# Patient Record
Sex: FEMALE | Race: WHITE | Employment: UNEMPLOYED | URBAN - METROPOLITAN AREA
[De-identification: names, ages, dates, MRNs, and addresses within clinical notes are randomized per-mention and may not be internally consistent; named-entity substitution may affect disease eponyms.]

---

## 2017-01-01 ENCOUNTER — LAB ENCOUNTER (OUTPATIENT)
Dept: LAB | Facility: HOSPITAL | Age: 67
End: 2017-01-01
Attending: INTERNAL MEDICINE
Payer: COMMERCIAL

## 2017-01-01 ENCOUNTER — HOSPITAL ENCOUNTER (OUTPATIENT)
Dept: MAMMOGRAPHY | Facility: HOSPITAL | Age: 67
Discharge: HOME | End: 2017-01-01
Attending: INTERNAL MEDICINE
Payer: COMMERCIAL

## 2017-01-01 DIAGNOSIS — D50.8 OTHER IRON DEFICIENCY ANEMIA: ICD-10-CM

## 2017-01-01 DIAGNOSIS — R92.8 ABNORMAL SCREENING MAMMOGRAM: ICD-10-CM

## 2017-01-01 PROCEDURE — 77066 DX MAMMO INCL CAD BI: CPT | Performed by: INTERNAL MEDICINE

## 2017-01-01 PROCEDURE — 85025 COMPLETE CBC W/AUTO DIFF WBC: CPT

## 2017-01-01 PROCEDURE — 36415 COLL VENOUS BLD VENIPUNCTURE: CPT

## 2017-01-27 ENCOUNTER — HOSPITAL ENCOUNTER (OUTPATIENT)
Dept: MAMMOGRAPHY | Facility: HOSPITAL | Age: 67
Discharge: HOME | End: 2017-01-27
Attending: INTERNAL MEDICINE
Payer: COMMERCIAL

## 2017-01-27 DIAGNOSIS — Z12.31 VISIT FOR SCREENING MAMMOGRAM: ICD-10-CM

## 2017-01-27 PROCEDURE — 77067 SCR MAMMO BI INCL CAD: CPT

## 2017-02-08 ENCOUNTER — HOSPITAL ENCOUNTER (OUTPATIENT)
Dept: MAMMOGRAPHY | Facility: HOSPITAL | Age: 67
Discharge: HOME | End: 2017-02-08
Attending: INTERNAL MEDICINE
Payer: COMMERCIAL

## 2017-02-08 DIAGNOSIS — R92.8 ABNORMAL MAMMOGRAM: ICD-10-CM

## 2017-02-08 PROCEDURE — 77065 DX MAMMO INCL CAD UNI: CPT

## 2017-03-21 PROBLEM — K57.92 DIVERTICULITIS: Status: ACTIVE | Noted: 2017-03-21

## 2017-04-13 PROCEDURE — 86735 MUMPS ANTIBODY: CPT | Performed by: INTERNAL MEDICINE

## 2017-04-13 PROCEDURE — 36415 COLL VENOUS BLD VENIPUNCTURE: CPT | Performed by: INTERNAL MEDICINE

## 2017-04-13 PROCEDURE — 86762 RUBELLA ANTIBODY: CPT | Performed by: INTERNAL MEDICINE

## 2017-04-13 PROCEDURE — 86765 RUBEOLA ANTIBODY: CPT | Performed by: INTERNAL MEDICINE

## 2017-05-15 ENCOUNTER — TELEPHONE (OUTPATIENT)
Dept: GASTROENTEROLOGY | Facility: CLINIC | Age: 67
End: 2017-05-15

## 2017-05-18 ENCOUNTER — HOSPITAL ENCOUNTER (OUTPATIENT)
Dept: GENERAL RADIOLOGY | Age: 67
Discharge: HOME | End: 2017-05-18
Attending: ORTHOPAEDIC SURGERY
Payer: COMMERCIAL

## 2017-05-18 DIAGNOSIS — R52 PAIN: ICD-10-CM

## 2017-05-18 PROCEDURE — 73564 X-RAY EXAM KNEE 4 OR MORE: CPT | Performed by: ORTHOPAEDIC SURGERY

## 2017-05-31 ENCOUNTER — HOSPITAL ENCOUNTER (OUTPATIENT)
Dept: MRI IMAGING | Age: 67
Discharge: HOME | End: 2017-05-31
Attending: ORTHOPAEDIC SURGERY
Payer: COMMERCIAL

## 2017-05-31 DIAGNOSIS — M23.90 INTERNAL DERANGEMENT OF KNEE: ICD-10-CM

## 2017-05-31 DIAGNOSIS — M19.90 OSTEOARTHRITIS: ICD-10-CM

## 2017-05-31 PROCEDURE — 73721 MRI JNT OF LWR EXTRE W/O DYE: CPT | Performed by: ORTHOPAEDIC SURGERY

## 2017-06-14 ENCOUNTER — LAB ENCOUNTER (OUTPATIENT)
Dept: LAB | Facility: HOSPITAL | Age: 67
End: 2017-06-14
Attending: INTERNAL MEDICINE
Payer: COMMERCIAL

## 2017-06-14 DIAGNOSIS — Z12.31 ENCOUNTER FOR MAMMOGRAM TO ESTABLISH BASELINE MAMMOGRAM: Primary | ICD-10-CM

## 2017-06-14 DIAGNOSIS — M19.91 PRIMARY OSTEOARTHRITIS: ICD-10-CM

## 2017-06-14 DIAGNOSIS — E03.9 MYXEDEMA HEART DISEASE: ICD-10-CM

## 2017-06-14 DIAGNOSIS — I51.9 MYXEDEMA HEART DISEASE: ICD-10-CM

## 2017-06-14 DIAGNOSIS — E78.6 FAMILIAL LIPOPROTEIN DEFICIENCY: ICD-10-CM

## 2017-06-14 PROCEDURE — 84443 ASSAY THYROID STIM HORMONE: CPT

## 2017-06-14 PROCEDURE — 86900 BLOOD TYPING SEROLOGIC ABO: CPT

## 2017-06-14 PROCEDURE — 80061 LIPID PANEL: CPT

## 2017-06-14 PROCEDURE — 36415 COLL VENOUS BLD VENIPUNCTURE: CPT

## 2017-06-14 PROCEDURE — 80076 HEPATIC FUNCTION PANEL: CPT

## 2017-06-14 PROCEDURE — 86850 RBC ANTIBODY SCREEN: CPT

## 2017-06-14 PROCEDURE — 86901 BLOOD TYPING SEROLOGIC RH(D): CPT

## 2017-06-14 PROCEDURE — 87641 MR-STAPH DNA AMP PROBE: CPT

## 2017-06-14 PROCEDURE — 80069 RENAL FUNCTION PANEL: CPT

## 2017-06-19 RX ORDER — GARLIC EXTRACT 500 MG
1 CAPSULE ORAL NIGHTLY
COMMUNITY
End: 2017-01-01

## 2017-06-19 RX ORDER — IBUPROFEN 800 MG/1
800 TABLET ORAL
Status: ON HOLD | COMMUNITY
End: 2017-06-30

## 2017-06-26 ENCOUNTER — APPOINTMENT (OUTPATIENT)
Dept: GENERAL RADIOLOGY | Facility: HOSPITAL | Age: 67
DRG: 470 | End: 2017-06-26
Attending: ORTHOPAEDIC SURGERY
Payer: COMMERCIAL

## 2017-06-26 ENCOUNTER — SURGERY (OUTPATIENT)
Age: 67
End: 2017-06-26

## 2017-06-26 ENCOUNTER — ANESTHESIA EVENT (OUTPATIENT)
Dept: SURGERY | Facility: HOSPITAL | Age: 67
DRG: 470 | End: 2017-06-26
Payer: COMMERCIAL

## 2017-06-26 ENCOUNTER — HOSPITAL ENCOUNTER (OUTPATIENT)
Facility: HOSPITAL | Age: 67
Setting detail: OBSERVATION
Discharge: NURSING, REHAB OR LONG-TERM CARE | DRG: 470 | End: 2017-06-30
Attending: ORTHOPAEDIC SURGERY | Admitting: ORTHOPAEDIC SURGERY
Payer: COMMERCIAL

## 2017-06-26 ENCOUNTER — ANESTHESIA (OUTPATIENT)
Dept: SURGERY | Facility: HOSPITAL | Age: 67
DRG: 470 | End: 2017-06-26
Payer: COMMERCIAL

## 2017-06-26 DIAGNOSIS — M17.11 PRIMARY OSTEOARTHRITIS OF RIGHT KNEE: Primary | ICD-10-CM

## 2017-06-26 LAB
INR BLD: 1.1 (ref 0.9–1.2)
PROTHROMBIN TIME: 13.5 SECONDS (ref 11.8–14.5)

## 2017-06-26 PROCEDURE — 88305 TISSUE EXAM BY PATHOLOGIST: CPT | Performed by: ORTHOPAEDIC SURGERY

## 2017-06-26 PROCEDURE — 64447 NJX AA&/STRD FEMORAL NRV IMG: CPT | Performed by: ORTHOPAEDIC SURGERY

## 2017-06-26 PROCEDURE — 88311 DECALCIFY TISSUE: CPT | Performed by: ORTHOPAEDIC SURGERY

## 2017-06-26 PROCEDURE — 76942 ECHO GUIDE FOR BIOPSY: CPT | Performed by: ORTHOPAEDIC SURGERY

## 2017-06-26 PROCEDURE — 99152 MOD SED SAME PHYS/QHP 5/>YRS: CPT | Performed by: ORTHOPAEDIC SURGERY

## 2017-06-26 PROCEDURE — 85610 PROTHROMBIN TIME: CPT | Performed by: ORTHOPAEDIC SURGERY

## 2017-06-26 PROCEDURE — 0SRC0J9 REPLACEMENT OF RIGHT KNEE JOINT WITH SYNTHETIC SUBSTITUTE, CEMENTED, OPEN APPROACH: ICD-10-PCS | Performed by: ORTHOPAEDIC SURGERY

## 2017-06-26 PROCEDURE — 73560 X-RAY EXAM OF KNEE 1 OR 2: CPT | Performed by: ORTHOPAEDIC SURGERY

## 2017-06-26 PROCEDURE — 3E0T3BZ INTRODUCTION OF ANESTHETIC AGENT INTO PERIPHERAL NERVES AND PLEXI, PERCUTANEOUS APPROACH: ICD-10-PCS | Performed by: ANESTHESIOLOGY

## 2017-06-26 DEVICE — IMPLANTABLE DEVICE: Type: IMPLANTABLE DEVICE | Site: KNEE | Status: FUNCTIONAL

## 2017-06-26 DEVICE — COMPONENT PTLR 28MM 1 PG WRE: Type: IMPLANTABLE DEVICE | Site: KNEE | Status: FUNCTIONAL

## 2017-06-26 DEVICE — CEMENT BONE ZIM PALICOS R: Type: IMPLANTABLE DEVICE | Site: KNEE | Status: FUNCTIONAL

## 2017-06-26 RX ORDER — SODIUM CHLORIDE, SODIUM LACTATE, POTASSIUM CHLORIDE, CALCIUM CHLORIDE 600; 310; 30; 20 MG/100ML; MG/100ML; MG/100ML; MG/100ML
INJECTION, SOLUTION INTRAVENOUS CONTINUOUS
Status: DISCONTINUED | OUTPATIENT
Start: 2017-06-26 | End: 2017-06-30

## 2017-06-26 RX ORDER — ROCURONIUM BROMIDE 10 MG/ML
INJECTION, SOLUTION INTRAVENOUS AS NEEDED
Status: DISCONTINUED | OUTPATIENT
Start: 2017-06-26 | End: 2017-06-26 | Stop reason: SURG

## 2017-06-26 RX ORDER — HYDROCODONE BITARTRATE AND ACETAMINOPHEN 5; 325 MG/1; MG/1
2 TABLET ORAL AS NEEDED
Status: DISCONTINUED | OUTPATIENT
Start: 2017-06-26 | End: 2017-06-26 | Stop reason: HOSPADM

## 2017-06-26 RX ORDER — ENOXAPARIN SODIUM 100 MG/ML
30 INJECTION SUBCUTANEOUS EVERY 12 HOURS SCHEDULED
Status: DISCONTINUED | OUTPATIENT
Start: 2017-06-26 | End: 2017-06-30

## 2017-06-26 RX ORDER — HYDROCODONE BITARTRATE AND ACETAMINOPHEN 5; 325 MG/1; MG/1
1 TABLET ORAL AS NEEDED
Status: DISCONTINUED | OUTPATIENT
Start: 2017-06-26 | End: 2017-06-26 | Stop reason: HOSPADM

## 2017-06-26 RX ORDER — NALOXONE HYDROCHLORIDE 0.4 MG/ML
80 INJECTION, SOLUTION INTRAMUSCULAR; INTRAVENOUS; SUBCUTANEOUS AS NEEDED
Status: DISCONTINUED | OUTPATIENT
Start: 2017-06-26 | End: 2017-06-26 | Stop reason: HOSPADM

## 2017-06-26 RX ORDER — CETIRIZINE HYDROCHLORIDE 10 MG/1
10 TABLET ORAL DAILY
Status: DISCONTINUED | OUTPATIENT
Start: 2017-06-27 | End: 2017-06-30

## 2017-06-26 RX ORDER — HYDROMORPHONE HYDROCHLORIDE 1 MG/ML
0.4 INJECTION, SOLUTION INTRAMUSCULAR; INTRAVENOUS; SUBCUTANEOUS EVERY 5 MIN PRN
Status: DISCONTINUED | OUTPATIENT
Start: 2017-06-26 | End: 2017-06-26 | Stop reason: HOSPADM

## 2017-06-26 RX ORDER — HYDROMORPHONE HYDROCHLORIDE 1 MG/ML
0.6 INJECTION, SOLUTION INTRAMUSCULAR; INTRAVENOUS; SUBCUTANEOUS EVERY 5 MIN PRN
Status: DISCONTINUED | OUTPATIENT
Start: 2017-06-26 | End: 2017-06-26 | Stop reason: HOSPADM

## 2017-06-26 RX ORDER — HYDROMORPHONE HYDROCHLORIDE 1 MG/ML
0.2 INJECTION, SOLUTION INTRAMUSCULAR; INTRAVENOUS; SUBCUTANEOUS EVERY 5 MIN PRN
Status: DISCONTINUED | OUTPATIENT
Start: 2017-06-26 | End: 2017-06-26 | Stop reason: HOSPADM

## 2017-06-26 RX ORDER — LACTOBACILLUS ACIDOPH-L.BULGARICUS 1 MILLION CELL CHEWABLE TABLET 1MM CELL
1 TABLET,CHEWABLE ORAL 3 TIMES DAILY
Status: DISCONTINUED | OUTPATIENT
Start: 2017-06-26 | End: 2017-06-30

## 2017-06-26 RX ORDER — LIDOCAINE HYDROCHLORIDE 10 MG/ML
INJECTION, SOLUTION EPIDURAL; INFILTRATION; INTRACAUDAL; PERINEURAL AS NEEDED
Status: DISCONTINUED | OUTPATIENT
Start: 2017-06-26 | End: 2017-06-26 | Stop reason: SURG

## 2017-06-26 RX ORDER — DIPHENHYDRAMINE HYDROCHLORIDE 50 MG/ML
12.5 INJECTION INTRAMUSCULAR; INTRAVENOUS EVERY 4 HOURS PRN
Status: DISCONTINUED | OUTPATIENT
Start: 2017-06-26 | End: 2017-06-30

## 2017-06-26 RX ORDER — 0.9 % SODIUM CHLORIDE 0.9 %
VIAL (ML) INJECTION
Status: DISPENSED
Start: 2017-06-26 | End: 2017-06-27

## 2017-06-26 RX ORDER — SODIUM CHLORIDE 9 MG/ML
INJECTION, SOLUTION INTRAVENOUS
Status: COMPLETED
Start: 2017-06-26 | End: 2017-06-26

## 2017-06-26 RX ORDER — MORPHINE SULFATE 2 MG/ML
2 INJECTION, SOLUTION INTRAMUSCULAR; INTRAVENOUS
Status: DISCONTINUED | OUTPATIENT
Start: 2017-06-26 | End: 2017-06-26

## 2017-06-26 RX ORDER — MORPHINE SULFATE 10 MG/ML
6 INJECTION, SOLUTION INTRAMUSCULAR; INTRAVENOUS EVERY 10 MIN PRN
Status: DISCONTINUED | OUTPATIENT
Start: 2017-06-26 | End: 2017-06-26 | Stop reason: HOSPADM

## 2017-06-26 RX ORDER — DIPHENHYDRAMINE HYDROCHLORIDE 50 MG/ML
25 INJECTION INTRAMUSCULAR; INTRAVENOUS ONCE AS NEEDED
Status: ACTIVE | OUTPATIENT
Start: 2017-06-26 | End: 2017-06-26

## 2017-06-26 RX ORDER — FAMOTIDINE 20 MG/1
20 TABLET ORAL ONCE
Status: COMPLETED | OUTPATIENT
Start: 2017-06-26 | End: 2017-06-26

## 2017-06-26 RX ORDER — DEXAMETHASONE SODIUM PHOSPHATE 4 MG/ML
VIAL (ML) INJECTION AS NEEDED
Status: DISCONTINUED | OUTPATIENT
Start: 2017-06-26 | End: 2017-06-26 | Stop reason: SURG

## 2017-06-26 RX ORDER — ONDANSETRON 2 MG/ML
4 INJECTION INTRAMUSCULAR; INTRAVENOUS ONCE AS NEEDED
Status: DISCONTINUED | OUTPATIENT
Start: 2017-06-26 | End: 2017-06-26 | Stop reason: HOSPADM

## 2017-06-26 RX ORDER — MAGNESIUM HYDROXIDE 1200 MG/15ML
LIQUID ORAL CONTINUOUS PRN
Status: DISCONTINUED | OUTPATIENT
Start: 2017-06-26 | End: 2017-06-26

## 2017-06-26 RX ORDER — MORPHINE SULFATE 2 MG/ML
2 INJECTION, SOLUTION INTRAMUSCULAR; INTRAVENOUS EVERY 10 MIN PRN
Status: DISCONTINUED | OUTPATIENT
Start: 2017-06-26 | End: 2017-06-26 | Stop reason: HOSPADM

## 2017-06-26 RX ORDER — SODIUM CHLORIDE, SODIUM LACTATE, POTASSIUM CHLORIDE, CALCIUM CHLORIDE 600; 310; 30; 20 MG/100ML; MG/100ML; MG/100ML; MG/100ML
INJECTION, SOLUTION INTRAVENOUS CONTINUOUS PRN
Status: DISCONTINUED | OUTPATIENT
Start: 2017-06-26 | End: 2017-06-26 | Stop reason: SURG

## 2017-06-26 RX ORDER — NALBUPHINE HCL 10 MG/ML
2.5 AMPUL (ML) INJECTION EVERY 4 HOURS PRN
Status: DISCONTINUED | OUTPATIENT
Start: 2017-06-26 | End: 2017-06-27

## 2017-06-26 RX ORDER — GLYCOPYRROLATE 0.2 MG/ML
INJECTION INTRAMUSCULAR; INTRAVENOUS AS NEEDED
Status: DISCONTINUED | OUTPATIENT
Start: 2017-06-26 | End: 2017-06-26 | Stop reason: SURG

## 2017-06-26 RX ORDER — MORPHINE SULFATE 4 MG/ML
4 INJECTION, SOLUTION INTRAMUSCULAR; INTRAVENOUS EVERY 10 MIN PRN
Status: DISCONTINUED | OUTPATIENT
Start: 2017-06-26 | End: 2017-06-26 | Stop reason: HOSPADM

## 2017-06-26 RX ORDER — NALOXONE HYDROCHLORIDE 0.4 MG/ML
0.08 INJECTION, SOLUTION INTRAMUSCULAR; INTRAVENOUS; SUBCUTANEOUS
Status: DISCONTINUED | OUTPATIENT
Start: 2017-06-26 | End: 2017-06-27

## 2017-06-26 RX ORDER — METOCLOPRAMIDE 10 MG/1
10 TABLET ORAL ONCE
Status: COMPLETED | OUTPATIENT
Start: 2017-06-26 | End: 2017-06-26

## 2017-06-26 RX ORDER — ONDANSETRON 2 MG/ML
4 INJECTION INTRAMUSCULAR; INTRAVENOUS EVERY 4 HOURS PRN
Status: DISCONTINUED | OUTPATIENT
Start: 2017-06-26 | End: 2017-06-30

## 2017-06-26 RX ORDER — LEVOTHYROXINE SODIUM 0.05 MG/1
50 TABLET ORAL
Status: DISCONTINUED | OUTPATIENT
Start: 2017-06-27 | End: 2017-06-30

## 2017-06-26 RX ORDER — ONDANSETRON 2 MG/ML
4 INJECTION INTRAMUSCULAR; INTRAVENOUS EVERY 6 HOURS PRN
Status: DISCONTINUED | OUTPATIENT
Start: 2017-06-26 | End: 2017-06-30

## 2017-06-26 RX ORDER — NEOSTIGMINE METHYLSULFATE 0.5 MG/ML
INJECTION INTRAVENOUS AS NEEDED
Status: DISCONTINUED | OUTPATIENT
Start: 2017-06-26 | End: 2017-06-26 | Stop reason: SURG

## 2017-06-26 RX ORDER — WARFARIN SODIUM 5 MG/1
5 TABLET ORAL ONCE
Status: COMPLETED | OUTPATIENT
Start: 2017-06-26 | End: 2017-06-26

## 2017-06-26 RX ADMIN — DEXAMETHASONE SODIUM PHOSPHATE 4 MG: 4 MG/ML VIAL (ML) INJECTION at 11:02:00

## 2017-06-26 RX ADMIN — SODIUM CHLORIDE, SODIUM LACTATE, POTASSIUM CHLORIDE, CALCIUM CHLORIDE: 600; 310; 30; 20 INJECTION, SOLUTION INTRAVENOUS at 10:49:00

## 2017-06-26 RX ADMIN — NEOSTIGMINE METHYLSULFATE 2 MG: 0.5 INJECTION INTRAVENOUS at 13:12:00

## 2017-06-26 RX ADMIN — SODIUM CHLORIDE, SODIUM LACTATE, POTASSIUM CHLORIDE, CALCIUM CHLORIDE: 600; 310; 30; 20 INJECTION, SOLUTION INTRAVENOUS at 13:12:00

## 2017-06-26 RX ADMIN — GLYCOPYRROLATE 0.2 MG: 0.2 INJECTION INTRAMUSCULAR; INTRAVENOUS at 13:12:00

## 2017-06-26 RX ADMIN — LIDOCAINE HYDROCHLORIDE 50 MG: 10 INJECTION, SOLUTION EPIDURAL; INFILTRATION; INTRACAUDAL; PERINEURAL at 10:52:00

## 2017-06-26 RX ADMIN — ROCURONIUM BROMIDE 30 MG: 10 INJECTION, SOLUTION INTRAVENOUS at 10:52:00

## 2017-06-26 NOTE — PLAN OF CARE
DISCHARGE PLANNING    • Discharge to home or other facility with appropriate resources Progressing    D/C planning pending physical therapy eval.     PAIN - ADULT    • Verbalizes/displays adequate comfort level or patient's stated pain goal Progressing

## 2017-06-26 NOTE — ADDENDUM NOTE
Addendum  created 06/26/17 1416 by Yury Wilkerson MD    Anesthesia Intra Blocks edited, Sign clinical note

## 2017-06-26 NOTE — ANESTHESIA PREPROCEDURE EVALUATION
Anesthesia PreOp Note    HPI:     Ashleigh Parents is a 61year old female who presents for preoperative consultation requested by: Kike Estrada MD    Date of Surgery: 6/26/2017    Procedure(s):  KNEE TOTAL REPLACEMENT  Indication: primary osteoarthr Marital status:   Spouse name: N/A    Years of education: N/A  Number of children: N/A     Occupational History  None on file     Social History Main Topics   Smoking status: Former Smoker  0.50 Packs/day  For 15.00 Years     Quit date: 3/17/1980 Risks Discussed With:  Patient  Discussed plan with:  CRNA      I have informed Saulo Adame  of the nature of the anesthetic plan, benefits, risks, major complications, and any alternative forms of anesthetic management.    All of the patient's que

## 2017-06-26 NOTE — ANESTHESIA POSTPROCEDURE EVALUATION
Patient: Raffy Bowles    Procedure Summary     Date:  06/26/17 Room / Location:  [address] [place] [address] MAIN OR [address] / [address] [place] [address] OR    Anesthesia Start:  0431 Anesthesia Stop:  2346    Procedure:  KNEE TOTAL REPLACEMENT (Right Knee) Diagnosis:  (primary osteoarthritis r

## 2017-06-26 NOTE — ANESTHESIA PROCEDURE NOTES
Peripheral Block    Anesthesiologist:  Diana Soto  Performed by:   Anesthesiologist  Patient Location:  PACU  Start Time:  6/26/2017 9:50 AM  End Time:  6/26/2017 9:57 AM  Site Identification: ultrasound guided, real time ultrasound guided, nerve stim

## 2017-06-26 NOTE — H&P
Notes  Encounter Date: 6/19/2017  Zechariah Gutierrez MD   Internal Medicine      Marcello Lauren is a 61year old female with a hx of osteoathritis right knee, who presents for a pre-operative physical exam. Patient is to have right total knee replace SKIN: denies any unusual skin lesions  EYES:denies blurred vision or double vision  HEENT: denies nasal congestion, sinus pain or pharyngitis  LUNGS: denies shortness of breath with exertion.  No orthopnea, cough or wheezing  CARDIOVASCULAR: denies chest pa Routing history could not be found for this note. This is because the note has never been routed or because communication record creation was suppressed.

## 2017-06-26 NOTE — BRIEF OP NOTE
[address] UNIT  Brief Op Note     Raffy Bowles Location: OR   [facility] [id number] MRN [id number]   Admission Date 6/26/2017 Operation Date 6/26/2017   Attending Physician Ute Riley MD Operating Physician Boaz Ariza MD

## 2017-06-26 NOTE — ADDENDUM NOTE
Addendum  created 06/26/17 180 by Adele Bella MD    Anesthesia Intra Blocks edited, Child order released for a procedure order, Sign clinical note

## 2017-06-26 NOTE — INTERVAL H&P NOTE
Pre-op Diagnosis: primary osteoarthritis right knee    The above referenced H&P was reviewed by Fatou Sandra MD on 6/26/2017, the patient was examined and no significant changes have occurred in the patient's condition since the H&P was performed.   I disc

## 2017-06-27 PROBLEM — E66.01 MORBID OBESITY DUE TO EXCESS CALORIES (HCC): Chronic | Status: ACTIVE | Noted: 2017-06-27

## 2017-06-27 LAB
BASOPHILS # BLD: 0 K/UL (ref 0–0.2)
BASOPHILS NFR BLD: 0 %
EOSINOPHIL # BLD: 0 K/UL (ref 0–0.7)
EOSINOPHIL NFR BLD: 0 %
ERYTHROCYTE [DISTWIDTH] IN BLOOD BY AUTOMATED COUNT: 13.2 % (ref 11–15)
HCT VFR BLD AUTO: 32.9 % (ref 35–48)
HGB BLD-MCNC: 11 G/DL (ref 12–16)
INR BLD: 1.1 (ref 0.9–1.2)
LYMPHOCYTES # BLD: 0.7 K/UL (ref 1–4)
LYMPHOCYTES NFR BLD: 7 %
MCH RBC QN AUTO: 32.1 PG (ref 27–32)
MCHC RBC AUTO-ENTMCNC: 33.6 G/DL (ref 32–37)
MCV RBC AUTO: 95.6 FL (ref 80–100)
MONOCYTES # BLD: 0.8 K/UL (ref 0–1)
MONOCYTES NFR BLD: 8 %
NEUTROPHILS # BLD AUTO: 8.8 K/UL (ref 1.8–7.7)
NEUTROPHILS NFR BLD: 85 %
PLATELET # BLD AUTO: 249 K/UL (ref 140–400)
PMV BLD AUTO: 7.9 FL (ref 7.4–10.3)
PROTHROMBIN TIME: 13.5 SECONDS (ref 11.8–14.5)
RBC # BLD AUTO: 3.44 M/UL (ref 3.7–5.4)
WBC # BLD AUTO: 10.4 K/UL (ref 4–11)

## 2017-06-27 PROCEDURE — 97110 THERAPEUTIC EXERCISES: CPT

## 2017-06-27 PROCEDURE — 97161 PT EVAL LOW COMPLEX 20 MIN: CPT

## 2017-06-27 PROCEDURE — 85025 COMPLETE CBC W/AUTO DIFF WBC: CPT | Performed by: ORTHOPAEDIC SURGERY

## 2017-06-27 PROCEDURE — 85610 PROTHROMBIN TIME: CPT | Performed by: ORTHOPAEDIC SURGERY

## 2017-06-27 PROCEDURE — 97165 OT EVAL LOW COMPLEX 30 MIN: CPT

## 2017-06-27 PROCEDURE — 97116 GAIT TRAINING THERAPY: CPT

## 2017-06-27 RX ORDER — HYDROCODONE BITARTRATE AND ACETAMINOPHEN 5; 325 MG/1; MG/1
1 TABLET ORAL EVERY 4 HOURS PRN
Status: DISCONTINUED | OUTPATIENT
Start: 2017-06-27 | End: 2017-06-30

## 2017-06-27 RX ORDER — HYDROCODONE BITARTRATE AND ACETAMINOPHEN 10; 325 MG/1; MG/1
1 TABLET ORAL EVERY 4 HOURS PRN
Status: DISCONTINUED | OUTPATIENT
Start: 2017-06-27 | End: 2017-06-30

## 2017-06-27 RX ORDER — HYDROCODONE BITARTRATE AND ACETAMINOPHEN 7.5; 325 MG/1; MG/1
1 TABLET ORAL EVERY 4 HOURS PRN
Status: DISCONTINUED | OUTPATIENT
Start: 2017-06-27 | End: 2017-06-30

## 2017-06-27 RX ORDER — WARFARIN SODIUM 5 MG/1
5 TABLET ORAL
Status: COMPLETED | OUTPATIENT
Start: 2017-06-27 | End: 2017-06-27

## 2017-06-27 NOTE — OCCUPATIONAL THERAPY NOTE
OCCUPATIONAL THERAPY EVALUATION - INPATIENT      Room Number: 432/432-A  Evaluation Date: 6/27/2017  Type of Evaluation: Initial  Presenting Problem:  (RT TKA)    Physician Order: IP Consult to Occupational Therapy  Reason for Therapy: ADL/IADL Dysfunction REPLACEMENT Right      Comment: Rodkishan    HOME SITUATION  Type of Home: House  Home Layout: One level (3 PRETTY without rail)  Lives With:  (wife- she works F/T days)    Toilet and Equipment: Standard height toilet  Shower/Tub and Equipment: Tub-shower combo technique with pt verbalizing understanding    Bedroom Mobility: CGA for short distance at bedside, limited by pain      FUNCTIONAL ADL ASSESSMENT  Grooming: set up from sitting  Toileting: NT  Upper Body Dressing: set up  Lower Body Dressing: min A, unabl

## 2017-06-27 NOTE — DISCHARGE PLANNING
SW received order for s/p total joint. SW met w/ pt to discuss discharge planning. Pt lives w/ her  and daughter, in a 1 level house w/ 3 external steps. Pt's  and daughter both work full time. Pt is independent w/ all ADL's.  Pt owns a walker

## 2017-06-27 NOTE — PROGRESS NOTES
ORTHO SURG: PO#1   Tmax = 99.4. AM LABS: INR = 1.1, WBC = 10,400, H/H = 11.0 / 32.9, PLATELETS  548,628. HEMOVAC OUTPUT OVER PAST TWO 12 HOUR SHIFTS: 0 ML / 70 ML. POOR SLEEP LAST PM DUE TO PAIN, NAUSEA AND \"DESAT ALARM\".   PE: Robert SCHWARZ

## 2017-06-27 NOTE — H&P
[facility]    History & Physical    Saul Taylor Patient Status:  Inpatient    1950 MRN [id number]   Location [facility] 4W/SW/SE Attending Chalo Rodriguez MD   Hosp Day # 1 PCP Dk Zhao MD     Date:   50 MCG Oral Tab Take 50 mcg by mouth before breakfast.         Review of Systems:   Constitutional: feels generally well prior to surgery Currently feeling nauseous and tired since iv narcotics.   Respiratory: decreased breathing throughout the night  Cardi

## 2017-06-27 NOTE — PHYSICAL THERAPY NOTE
PHYSICAL THERAPY KNEE TREATMENT NOTE - INPATIENT     Room Number: 432/432-A             Presenting Problem: elective R TKR    Problem List  Active Problems:    Hypothyroidism    Primary osteoarthritis of right knee    Morbid obesity due to excess calories (ft): 2 x 20 ft  Assistive Device: Rolling walker  Pattern:  (Decreased weightbearing on R LE, short step L)  Stoop/Curb Assistance: Not tested       Additional Information:     Exercises AM Session PM Session   Ankle Pumps  reps 10 reps   Quad Sets  reps

## 2017-06-27 NOTE — PHYSICAL THERAPY NOTE
PHYSICAL THERAPY EVALUATION - INPATIENT     Room Number: 432/432-A  Evaluation Date: 6/27/2017  Type of Evaluation: New  Physician Order: See Comment for Specific Order    Presenting Problem: elective R TKR  Reason for Therapy: Mobility Dysfunction and is within functional limits     Lower extremity strength is within functional limits     BALANCE           Dynamic Standing: Good    ADDITIONAL TESTS                                    NEUROLOGICAL FINDINGS                      ACTIVITY TOLERANCE  Moves sl manifest themselves as functional limitations in bed mobility, transfers, and gait. The patient is below her baseline and would benefit from skilled inpatient PT to address the above deficits to assist patient in returning to prior level of function.     D

## 2017-06-27 NOTE — DISCHARGE PLANNING
SW received call from [facility] and pt's insurance is not in network. SW met w/ pt to update re: above. SW told pt about [place] affiliation w/ [facility]. Pt stated she does not want [facility]. GRADY gave pt [facility] list to review.  GRADY requested referral to be sent to [facility]

## 2017-06-27 NOTE — PLAN OF CARE
DISCHARGE PLANNING    • Discharge to home or other facility with appropriate resources Progressing    Plan to d/c home with Jerilyn Pride when cleared.      PAIN - ADULT    • Verbalizes/displays adequate comfort level or patient's stated pain goal Progressing    Pain

## 2017-06-27 NOTE — OPERATIVE REPORT
[facility]    PATIENT'S NAME: Juvencio Nova   ATTENDING PHYSICIAN: Edy Justice MD   OPERATING PHYSICIAN: Edy Justice MD   PATIENT ACCOUNT#:   [de-identified]    LOCATION:  [address] [place] [address] #:   [id number]       DATE OF B nerve block was placed by Dr. Rumaldo Kocher. The patient was transferred to the operating room, positioned supine on the operating table. General endotracheal anesthesia was established. A Salamanca urinary catheter was placed by operating room nursing staff.   Charles resected, as were the anterior horn and body of both the medial and lateral menisci. The deep medial collateral ligament was released following medial meniscus excision.   This was initiated by sharp technique and completed with a 3/4-inch curved osteotome Drill holes were made through the sizing block medially and laterally followed by placement of a distal femoral resection block of a 67.5 mm dimension. This was impacted to the flat distal surface of the femur and secured with bone nails on either side. that was placed. Tracking with the femur was suboptimal.  Folds of the lateral parapatellar synovium were released by cutting cautery, and this served to achieve appropriate patellofemoral tracking.   Lug holes were drilled in the distal femur through the Biomet Interlock cruciate stemmed tibial plate of 71 mm size. This was impacted to its final position with removal of excess cement.   Compression was maintained while the patella was exposed, prepared with pulse saline lavage irrigation, followed by cemen proximally and superficially interrupted 2-0 Vicryl sutures were placed. Running 3-0 nylon closed the skin. The wound drain was secured with 0 silk suture. Sterile petroleum gauze, dry gauze dressings were placed and secured.   The wound drain was [facility]

## 2017-06-28 LAB
BASOPHILS # BLD: 0.1 K/UL (ref 0–0.2)
BASOPHILS NFR BLD: 1 %
EOSINOPHIL # BLD: 0 K/UL (ref 0–0.7)
EOSINOPHIL NFR BLD: 0 %
ERYTHROCYTE [DISTWIDTH] IN BLOOD BY AUTOMATED COUNT: 13.7 % (ref 11–15)
HCT VFR BLD AUTO: 32.1 % (ref 35–48)
HGB BLD-MCNC: 10.9 G/DL (ref 12–16)
INR BLD: 1.2 (ref 0.9–1.2)
LYMPHOCYTES # BLD: 1.5 K/UL (ref 1–4)
LYMPHOCYTES NFR BLD: 15 %
MCH RBC QN AUTO: 32.5 PG (ref 27–32)
MCHC RBC AUTO-ENTMCNC: 33.8 G/DL (ref 32–37)
MCV RBC AUTO: 96.2 FL (ref 80–100)
MONOCYTES # BLD: 1.1 K/UL (ref 0–1)
MONOCYTES NFR BLD: 11 %
NEUTROPHILS # BLD AUTO: 7.4 K/UL (ref 1.8–7.7)
NEUTROPHILS NFR BLD: 73 %
PLATELET # BLD AUTO: 255 K/UL (ref 140–400)
PMV BLD AUTO: 8.1 FL (ref 7.4–10.3)
PROTHROMBIN TIME: 14.4 SECONDS (ref 11.8–14.5)
RBC # BLD AUTO: 3.34 M/UL (ref 3.7–5.4)
WBC # BLD AUTO: 10.1 K/UL (ref 4–11)

## 2017-06-28 PROCEDURE — 97116 GAIT TRAINING THERAPY: CPT

## 2017-06-28 PROCEDURE — 97110 THERAPEUTIC EXERCISES: CPT

## 2017-06-28 PROCEDURE — 85610 PROTHROMBIN TIME: CPT | Performed by: ORTHOPAEDIC SURGERY

## 2017-06-28 PROCEDURE — 85025 COMPLETE CBC W/AUTO DIFF WBC: CPT | Performed by: ORTHOPAEDIC SURGERY

## 2017-06-28 RX ORDER — WARFARIN SODIUM 7.5 MG/1
7.5 TABLET ORAL
Status: COMPLETED | OUTPATIENT
Start: 2017-06-28 | End: 2017-06-28

## 2017-06-28 NOTE — PHYSICAL THERAPY NOTE
PHYSICAL THERAPY KNEE TREATMENT NOTE - INPATIENT     Room Number: 432/432-A             Presenting Problem: elective R TKR    Problem List  Active Problems:    Hypothyroidism    Primary osteoarthritis of right knee    Morbid obesity due to excess calories assistance  Distance (ft): 2x 50  Assistive Device: Rolling walker  Pattern:  (Decreased weightbearing on R LE, short step L)  Stoop/Curb Assistance: Not tested       Additional Information:     Exercises AM Session PM Session   Ankle Pumps  10reps 10 reps

## 2017-06-28 NOTE — OCCUPATIONAL THERAPY NOTE
Chart reviewed. Patient received for OT treatment I bed on CPM. She requested therapy return tomorrow morning to review ADLs. Will attempt tomorrow morning.

## 2017-06-28 NOTE — PROGRESS NOTES
ORTHO SURG: PO#2  AM LABS: INR = 1.2, WBC = 10,100, H/H = 10.9 / 32.1, P[LATELETS = 255,000. PAIN CONTROLLED. VOIDING WITHOUT DIFFICULTY.  PE: ALERT, NAD, USING RIGHT KNEE CPM, RIGHT KNEE WOUND AND DRAIN SIGHT ARE CLEAN, DRY AND WITHOUT ERYTHEMA OR INDURAT

## 2017-06-29 LAB
BASOPHILS # BLD: 0 K/UL (ref 0–0.2)
BASOPHILS NFR BLD: 1 %
EOSINOPHIL # BLD: 0 K/UL (ref 0–0.7)
EOSINOPHIL NFR BLD: 0 %
ERYTHROCYTE [DISTWIDTH] IN BLOOD BY AUTOMATED COUNT: 13.2 % (ref 11–15)
HCT VFR BLD AUTO: 30.5 % (ref 35–48)
HGB BLD-MCNC: 10.5 G/DL (ref 12–16)
INR BLD: 1.4 (ref 0.9–1.2)
LYMPHOCYTES # BLD: 1.4 K/UL (ref 1–4)
LYMPHOCYTES NFR BLD: 17 %
MCH RBC QN AUTO: 32.7 PG (ref 27–32)
MCHC RBC AUTO-ENTMCNC: 34.4 G/DL (ref 32–37)
MCV RBC AUTO: 94.9 FL (ref 80–100)
MONOCYTES # BLD: 0.9 K/UL (ref 0–1)
MONOCYTES NFR BLD: 10 %
NEUTROPHILS # BLD AUTO: 6.1 K/UL (ref 1.8–7.7)
NEUTROPHILS NFR BLD: 72 %
PLATELET # BLD AUTO: 236 K/UL (ref 140–400)
PMV BLD AUTO: 7.7 FL (ref 7.4–10.3)
PROTHROMBIN TIME: 16.9 SECONDS (ref 11.8–14.5)
RBC # BLD AUTO: 3.22 M/UL (ref 3.7–5.4)
WBC # BLD AUTO: 8.5 K/UL (ref 4–11)

## 2017-06-29 PROCEDURE — 85610 PROTHROMBIN TIME: CPT | Performed by: ORTHOPAEDIC SURGERY

## 2017-06-29 PROCEDURE — 97110 THERAPEUTIC EXERCISES: CPT

## 2017-06-29 PROCEDURE — 97530 THERAPEUTIC ACTIVITIES: CPT

## 2017-06-29 PROCEDURE — 36415 COLL VENOUS BLD VENIPUNCTURE: CPT | Performed by: ORTHOPAEDIC SURGERY

## 2017-06-29 PROCEDURE — 97535 SELF CARE MNGMENT TRAINING: CPT

## 2017-06-29 PROCEDURE — 85025 COMPLETE CBC W/AUTO DIFF WBC: CPT | Performed by: ORTHOPAEDIC SURGERY

## 2017-06-29 PROCEDURE — 97116 GAIT TRAINING THERAPY: CPT

## 2017-06-29 RX ORDER — WARFARIN SODIUM 5 MG/1
5 TABLET ORAL NIGHTLY
Status: DISCONTINUED | OUTPATIENT
Start: 2017-06-29 | End: 2017-06-30

## 2017-06-29 NOTE — PAYOR COMM NOTE
REF# [id number]      [facility]             `  (For Outpatient Use Only) Initial Admit Date: 6/26/2017   Inpt/Obs Admit Date: Inpt: 6/26/17 / Obs: N/A   Discharge Date: [address]:  [de-identified]   MRN: [de-identified]   CSN: [id number]     Subscriber Name:   Subscriber :     Subscriber ID:   Pt Rel to Subscriber:     TERTIARY INSURANCE   Payor:   Plan:     Group Number:   Insurance Type:     Subscriber Name:   Subscriber :     Subscriber ID:   Pt Rel to Subscriber: [facility] Account Comment: Reshma        Family History   Problem Relation Age of Onset   • Ovarian Cancer Maternal Grandmother     • Breast Cancer Paternal Aunt     • Diabetes Father     • Heart Disorder Mother        Social History:  Smoking status: Former Smoker HCT 32.9 (L) 06/27/2017    06/27/2017   CREATSERUM 0.64 06/14/2017   BUN 16 06/14/2017    06/14/2017   K 4.0 06/14/2017    06/14/2017   CO2 24 06/14/2017   GLU 93 06/14/2017   CA 9.4 06/14/2017   ALB 3.8 06/14/2017   ALB 3.8 06/14/2017   OTHER                            Comment lower right jaw oral surgery                        OTHER                             Comment cyst right wrist                     ARTHROSCOPY OF JOINT UNLISTED          Comment right meniscus tear               NECK: supple with full ROM, no bruits, nodes or masses  CHEST: no chest tenderness  Breasts: No masses bilaterally noaxillary lymphadenopathy  LUNGS: clear to auscultation bilateral  CARDIO: S1, S2 no murmur, rub or gallop  GI: abdomen soft, non tender, no PROCEDURE PERFORMED:  Right total knee replacement arthroplasty.     ASSISTANT:  MOON Chandra, and MOON Chase.     ANESTHESIA:  Right femoral nerve block followed by general endotracheal anesthesia.     INDICATIONS:  A 79-year-old female w A standard povidone-iodine surgical scrub preparation of the right lower extremity from foot to tourniquet was carried out. The area was painted with povidone-iodine solution.   Free draping of the right lower limb was applied in standard fashion with appl Retractors were placed with the knee in flexion to expose the proximal tibia. Extramedullary tibial resection guide was placed and aligned to the knee, leg, and ankle.   With a stylus reference of 4 mm made to the medial tibial condyle, the proximal tibial Intramedullary drill hole to distal femur was made followed by manual IM reaming and then placement of a distal femoral resection guide set at 4 degrees valgus and 11 mm distal resection.   Once this was balanced to the posterior condyles, a distal femoral the proximal tibia to a flat finished surface with resection of any residual bone tissue. Trial components were replaced. Optimal extension was accomplished. Trials with 10 and 12 mm size tibial bearing components were carried out.   Alignment to the leg The patella was addressed by securing it with 2 towel clips. Caliper measurements showed thickness of 21 mm. A frontal plane osteotomy resection was then carried down to a caliper measured thickness of 13 mm.   Subsequent sizing of the patella showed opti Trial components were removed. With the knee in flexion, a 71 mm tibial template was placed and secured with 2 predrilled pins. A reaming \"tower\" was secured to the template.   Powered IM reaming of the tibia was done followed by cruciate stemmed broach Pulse saline lavage irrigation of the knee was carried out. A wound drain was placed for proximal lateral stab wound. The pneumatic tourniquet was deflated. Total tourniquet time was 86 minutes. Bleeding points controlled by electrocautery.   [employer] ORTHO SURG: PO#2  AM LABS: INR = 1.2, WBC = 10,100, H/H = 10.9 / 32.1, P[LATELETS = 255,000. PAIN CONTROLLED. VOIDING WITHOUT DIFFICULTY.  PE: ALERT, NAD, USING RIGHT KNEE CPM, RIGHT KNEE WOUND AND DRAIN SIGHT ARE CLEAN, DRY AND WITHOUT ERYTHEMA OR INDURAT Cardiovascular: S1, S2 normal, no murmur, click, rub or gallop, regular rate and rhythm  Abdominal: soft, non-tender; bowel sounds normal; no masses,  no organomegaly  Extremities: Right knee and postop surgical dressing swelling lower leg  Pulses: 2+ and ORTHO SURG: PO#3  Tmax = 98.9. AM LABS: INR = 1.4, WBC = 8,500, H/H = 10.5 / 30.5, PLATELETS = 174,789. NO POST-OP BM. PAIN IS CONTROLLED WITH NORCO SLIDING SCALE. PT. THINKING ABOUT SNF.    PE: UP IN RECLINER, ALERT, NAD, RIGHT KNEE WOUND AND DRESSING WER

## 2017-06-29 NOTE — PHYSICAL THERAPY NOTE
PHYSICAL THERAPY KNEE TREATMENT NOTE - INPATIENT     Room Number: 432/432-A             Presenting Problem: elective R TKR    Problem List  Active Problems:    Hypothyroidism    Primary osteoarthritis of right knee    Morbid obesity due to excess calories Gait Assistance: Minimum assistance  Distance (ft): 2 x 100  Assistive Device: Rolling walker  Pattern:  (Decreased weightbearing on R LE, short step L)  Stoop/Curb Assistance: Not tested       Additional Information:     Exercises AM Session PM Session

## 2017-06-29 NOTE — OCCUPATIONAL THERAPY NOTE
OCCUPATIONAL THERAPY TREATMENT NOTE - INPATIENT     Room Number: 432/432-A         Presenting Problem:  (RT TKA)    Problem List  Active Problems:    Hypothyroidism    Primary osteoarthritis of right knee    Morbid obesity due to excess calories ([facility] [address] rinsing, drying)?: A Little  -   Toileting, which includes using toilet, bedpan or urinal? : None  -   Putting on and taking off regular upper body clothing?: None  -   Taking care of personal grooming such as brushing teeth?: None  -   Eating meals?: None

## 2017-06-29 NOTE — PROGRESS NOTES
ORTHO SURG: PO#3  Tmax = 98.9. AM LABS: INR = 1.4, WBC = 8,500, H/H = 10.5 / 30.5, PLATELETS = 071,988. NO POST-OP BM. PAIN IS CONTROLLED WITH NORCO SLIDING SCALE. PT. THINKING ABOUT SNF.    PE: UP IN RECLINER, ALERT, NAD, RIGHT KNEE WOUND AND DRESSING WER

## 2017-06-29 NOTE — DISCHARGE PLANNING
SW was notified of the pt. Now wanting rehab. SW met with the pt. And provided her with options. The pt. Is agreeable to a referral to [facility]. Referral made and DON screen has been requested from Yusef Benz.   Hema Emanuel is working on [employer]

## 2017-06-30 VITALS
DIASTOLIC BLOOD PRESSURE: 74 MMHG | SYSTOLIC BLOOD PRESSURE: 152 MMHG | RESPIRATION RATE: 18 BRPM | HEIGHT: 64 IN | HEART RATE: 81 BPM | WEIGHT: 236 LBS | TEMPERATURE: 99 F | BODY MASS INDEX: 40.29 KG/M2 | OXYGEN SATURATION: 96 %

## 2017-06-30 LAB
INR BLD: 1.7 (ref 0.9–1.2)
PROTHROMBIN TIME: 19.2 SECONDS (ref 11.8–14.5)

## 2017-06-30 PROCEDURE — 97116 GAIT TRAINING THERAPY: CPT

## 2017-06-30 PROCEDURE — 97530 THERAPEUTIC ACTIVITIES: CPT

## 2017-06-30 PROCEDURE — 85610 PROTHROMBIN TIME: CPT | Performed by: INTERNAL MEDICINE

## 2017-06-30 PROCEDURE — 97535 SELF CARE MNGMENT TRAINING: CPT

## 2017-06-30 PROCEDURE — 97110 THERAPEUTIC EXERCISES: CPT

## 2017-06-30 RX ORDER — POLYETHYLENE GLYCOL 3350 17 G/17G
17 POWDER, FOR SOLUTION ORAL DAILY
Status: DISCONTINUED | OUTPATIENT
Start: 2017-06-30 | End: 2017-06-30

## 2017-06-30 RX ORDER — HYDROCODONE BITARTRATE AND ACETAMINOPHEN 7.5; 325 MG/1; MG/1
1 TABLET ORAL EVERY 4 HOURS PRN
Qty: 75 TABLET | Refills: 0 | Status: SHIPPED | OUTPATIENT
Start: 2017-06-30 | End: 2017-01-01

## 2017-06-30 RX ORDER — WARFARIN SODIUM 5 MG/1
5 TABLET ORAL NIGHTLY
Qty: 10 TABLET | Refills: 0 | Status: SHIPPED | OUTPATIENT
Start: 2017-06-30 | End: 2017-01-01

## 2017-06-30 RX ORDER — ENOXAPARIN SODIUM 100 MG/ML
30 INJECTION SUBCUTANEOUS EVERY 12 HOURS SCHEDULED
Qty: 10 SYRINGE | Refills: 0 | Status: SHIPPED | OUTPATIENT
Start: 2017-06-30 | End: 2017-01-01

## 2017-06-30 RX ORDER — POLYETHYLENE GLYCOL 3350 17 G/17G
17 POWDER, FOR SOLUTION ORAL DAILY
Qty: 10 EACH | Refills: 0 | Status: SHIPPED | OUTPATIENT
Start: 2017-06-30 | End: 2017-01-01

## 2017-06-30 NOTE — PLAN OF CARE
DISCHARGE PLANNING    • Discharge to home or other facility with appropriate resources Completed    [facility]      PAIN - ADULT    • Verbalizes/displays adequate comfort level or patient's stated pain goal Completed    Pain well managed

## 2017-06-30 NOTE — PROGRESS NOTES
[address] Patient Status:  Inpatient    1950 MRN [id number]   Location [facility] 4W/SW/SE Attending Camila Anglin MD   Hosp Day # 4 PCP Tiffany Eubanks MD     Amarjit Baca is a 61year old resp. rate 18, height 64\", weight 236 lb (107 kg), SpO2 95 %. Vital signs are normal.    Output: Producing urine. HEENT: Normal HEENT exam.    Lungs:  Normal effort. Heart: Normal rate. No murmur, gallop or friction rub.    Chest: No chest wall ten

## 2017-06-30 NOTE — PLAN OF CARE
Report called to Garland Huff at [place]. All questions answered. Patient discharging with paper [facility] script and Dr. Adis Carranza handwritten orders in  art.

## 2017-06-30 NOTE — OCCUPATIONAL THERAPY NOTE
OCCUPATIONAL THERAPY TREATMENT NOTE - INPATIENT     Room Number: 432/432-A         Presenting Problem:  (RT TKA)    Problem List  Active Problems:    Hypothyroidism    Primary osteoarthritis of right knee    Morbid obesity due to excess calories ([facility] [address] Taking care of personal grooming such as brushing teeth?: None  -   Eating meals?: None    AM-PAC Score:  Score: 22  Approx Degree of Impairment: 25.8%  Standardized Score (AM-PAC Scale): 47.1  CMS Modifier (G-Code): CJ    FUNCTIONAL TRANSFER ASSESSMENT  S

## 2017-06-30 NOTE — PROGRESS NOTES
Came to see patient 6/29. She told me that Dr. Sunita Cortes had been in earlier and that I was not to see her and declined to be evaluated. Apparently Dr. Sunita Cortes had not in fact been in. Aminata Escudero

## 2017-06-30 NOTE — PROGRESS NOTES
ORTHO SURG: PO#4  AFEB. AM LABS: INR = 1.7. PE: RIGHT KNEE WOUND IS CLEAN AND DRY WITHOUT ERYTHEMA OR INDURATION. ASSESS: OK FOR D/C TO SNF. ORDERS WRITTEN.

## 2017-06-30 NOTE — PHYSICAL THERAPY NOTE
PHYSICAL THERAPY KNEE TREATMENT NOTE - INPATIENT     Room Number: 432/432-A             Presenting Problem: elective R TKR    Problem List  Active Problems:    Hypothyroidism    Primary osteoarthritis of right knee    Morbid obesity due to excess calories (AM-PAC Scale): 43.63   CMS Modifier (G-Code): CK    FUNCTIONAL ABILITY STATUS  Gait Assessment   Gait Assistance: Minimum assistance  Distance (ft): 2 x 125  Assistive Device: Rolling walker  Pattern:  (Decreased weightbearing on R LE, short step L)  Stoo

## 2017-06-30 NOTE — DISCHARGE PLANNING
The pt's insurance has been approved for rehab. The pt. Is scheduled to discharge to [facility] today 6/30 at 5p, via [address]. The pt. Is aware and agreeable.       Report [address] [facility], [address] [place] [address]

## 2017-07-23 NOTE — DISCHARGE SUMMARY
[place] [facility] - [facility]    Discharge Summary    Ricky Anderson Patient Status:  Inpatient    1950 MRN [id number]   Location [facility] 4W/SW/SE Attending No att. providers found   [address] Day # 4 PCP Yves Roque MD     Date of Ad Unchanged    Acidophilus/Pectin Oral Cap  Take 1 capsule by mouth nightly., Historical    loratadine 10 MG Oral Tab  Take 10 mg by mouth daily. , Historical    Levothyroxine Sodium 50 MCG Oral Tab  Take 1 tablet (50 mcg total) by mouth before breakfast., Sc

## 2017-10-17 ENCOUNTER — HOSPITAL ENCOUNTER (OUTPATIENT)
Dept: ULTRASOUND IMAGING | Facility: HOSPITAL | Age: 67
Discharge: HOME | End: 2017-10-17
Attending: ORTHOPAEDIC SURGERY
Payer: COMMERCIAL

## 2017-10-17 DIAGNOSIS — M71.21 BAKER'S CYST OF KNEE, RIGHT: ICD-10-CM

## 2017-10-17 PROCEDURE — 76882 US LMTD JT/FCL EVL NVASC XTR: CPT | Performed by: ORTHOPAEDIC SURGERY

## 2017-10-27 ENCOUNTER — HOSPITAL ENCOUNTER (OUTPATIENT)
Dept: ULTRASOUND IMAGING | Facility: HOSPITAL | Age: 67
Discharge: HOME | End: 2017-10-27
Attending: ORTHOPAEDIC SURGERY
Payer: COMMERCIAL

## 2017-10-27 VITALS — SYSTOLIC BLOOD PRESSURE: 166 MMHG | DIASTOLIC BLOOD PRESSURE: 85 MMHG | RESPIRATION RATE: 16 BRPM | HEART RATE: 78 BPM

## 2017-10-27 DIAGNOSIS — M71.21 BAKER'S CYST OF KNEE, RIGHT: ICD-10-CM

## 2017-10-27 PROCEDURE — 10160 PNXR ASPIR ABSC HMTMA BULLA: CPT | Performed by: ORTHOPAEDIC SURGERY

## 2017-10-27 NOTE — IMAGING NOTE
PT ARRIVED TO ROOM 3   SCANS BY marcial castellano us tech     HX TAKEN PROCEDURE EXPLAINED QUESTIONS ANSWERED    PT CONSENTED AT [facility]  DR DXION    TIMEOUT TAKEN AT 1530     AREA CLEANED CHLORO PREP TO SITE STERILE DRAPE TO SIT

## 2017-11-09 ENCOUNTER — HOSPITAL ENCOUNTER (OUTPATIENT)
Dept: ULTRASOUND IMAGING | Facility: HOSPITAL | Age: 67
Discharge: HOME | End: 2017-11-09
Attending: ORTHOPAEDIC SURGERY
Payer: COMMERCIAL

## 2017-11-09 DIAGNOSIS — M71.21 POPLITEAL CYST, RIGHT: ICD-10-CM

## 2018-01-01 ENCOUNTER — HOSPITAL ENCOUNTER (OUTPATIENT)
Dept: GENERAL RADIOLOGY | Facility: HOSPITAL | Age: 68
Discharge: HOME | End: 2018-01-01
Attending: INTERNAL MEDICINE
Payer: COMMERCIAL

## 2018-01-01 ENCOUNTER — HOSPITAL ENCOUNTER (INPATIENT)
Facility: HOSPITAL | Age: 68
LOS: 1 days | DRG: 180 | End: 2018-01-01
Attending: HOSPITALIST | Admitting: HOSPITALIST
Payer: OTHER MISCELLANEOUS

## 2018-01-01 ENCOUNTER — APPOINTMENT (OUTPATIENT)
Dept: GENERAL RADIOLOGY | Facility: HOSPITAL | Age: 68
DRG: 846 | End: 2018-01-01
Attending: HOSPITALIST
Payer: COMMERCIAL

## 2018-01-01 ENCOUNTER — HOSPITAL ENCOUNTER (OUTPATIENT)
Dept: INTERVENTIONAL RADIOLOGY/VASCULAR | Facility: HOSPITAL | Age: 68
Discharge: HOME | End: 2018-01-01
Attending: INTERNAL MEDICINE | Admitting: INTERNAL MEDICINE
Payer: COMMERCIAL

## 2018-01-01 ENCOUNTER — HOSPITAL ENCOUNTER (OUTPATIENT)
Dept: ULTRASOUND IMAGING | Facility: HOSPITAL | Age: 68
Discharge: HOME | End: 2018-01-01
Attending: INTERNAL MEDICINE
Payer: COMMERCIAL

## 2018-01-01 ENCOUNTER — APPOINTMENT (OUTPATIENT)
Dept: INTERVENTIONAL RADIOLOGY/VASCULAR | Facility: HOSPITAL | Age: 68
DRG: 846 | End: 2018-01-01
Attending: HOSPITALIST
Payer: COMMERCIAL

## 2018-01-01 ENCOUNTER — APPOINTMENT (OUTPATIENT)
Dept: CV DIAGNOSTICS | Facility: HOSPITAL | Age: 68
DRG: 846 | End: 2018-01-01
Attending: INTERNAL MEDICINE
Payer: COMMERCIAL

## 2018-01-01 ENCOUNTER — APPOINTMENT (OUTPATIENT)
Dept: CT IMAGING | Facility: HOSPITAL | Age: 68
DRG: 846 | End: 2018-01-01
Attending: SURGERY
Payer: COMMERCIAL

## 2018-01-01 ENCOUNTER — APPOINTMENT (OUTPATIENT)
Dept: CT IMAGING | Facility: HOSPITAL | Age: 68
DRG: 846 | End: 2018-01-01
Attending: INTERNAL MEDICINE
Payer: COMMERCIAL

## 2018-01-01 ENCOUNTER — APPOINTMENT (OUTPATIENT)
Dept: LAB | Facility: HOSPITAL | Age: 68
End: 2018-01-01
Attending: INTERNAL MEDICINE
Payer: COMMERCIAL

## 2018-01-01 ENCOUNTER — HOSPITAL ENCOUNTER (INPATIENT)
Facility: HOSPITAL | Age: 68
LOS: 12 days | Discharge: HOSPICE | DRG: 846 | End: 2018-01-01
Attending: EMERGENCY MEDICINE | Admitting: HOSPITALIST
Payer: COMMERCIAL

## 2018-01-01 VITALS
RESPIRATION RATE: 20 BRPM | HEART RATE: 98 BPM | TEMPERATURE: 98 F | HEIGHT: 63 IN | SYSTOLIC BLOOD PRESSURE: 138 MMHG | WEIGHT: 239 LBS | BODY MASS INDEX: 42.35 KG/M2 | DIASTOLIC BLOOD PRESSURE: 68 MMHG | OXYGEN SATURATION: 90 %

## 2018-01-01 VITALS
HEART RATE: 87 BPM | OXYGEN SATURATION: 96 % | BODY MASS INDEX: 42 KG/M2 | RESPIRATION RATE: 20 BRPM | DIASTOLIC BLOOD PRESSURE: 76 MMHG | SYSTOLIC BLOOD PRESSURE: 159 MMHG | WEIGHT: 235 LBS

## 2018-01-01 VITALS
TEMPERATURE: 99 F | HEART RATE: 121 BPM | OXYGEN SATURATION: 91 % | SYSTOLIC BLOOD PRESSURE: 119 MMHG | RESPIRATION RATE: 12 BRPM | DIASTOLIC BLOOD PRESSURE: 42 MMHG

## 2018-01-01 DIAGNOSIS — R17 JAUNDICE: Primary | ICD-10-CM

## 2018-01-01 DIAGNOSIS — R06.02 SHORTNESS OF BREATH: ICD-10-CM

## 2018-01-01 DIAGNOSIS — E87.6 HYPOKALEMIA: ICD-10-CM

## 2018-01-01 DIAGNOSIS — E87.6 POTASSIUM DEFICIENCY: ICD-10-CM

## 2018-01-01 DIAGNOSIS — C78.7 LIVER METASTASES (HCC): ICD-10-CM

## 2018-01-01 DIAGNOSIS — N17.9 ACUTE KIDNEY INJURY (HCC): ICD-10-CM

## 2018-01-01 DIAGNOSIS — R93.89 ABNORMAL FINDING ON IMAGING: ICD-10-CM

## 2018-01-01 DIAGNOSIS — R79.89 ABNORMAL LIVER FUNCTION TESTS: ICD-10-CM

## 2018-01-01 DIAGNOSIS — C34.90 SMALL CELL LUNG CANCER (HCC): ICD-10-CM

## 2018-01-01 LAB
ALBUMIN SERPL BCP-MCNC: 1.5 G/DL (ref 3.5–4.8)
ALBUMIN SERPL BCP-MCNC: 1.5 G/DL (ref 3.5–4.8)
ALBUMIN SERPL BCP-MCNC: 1.6 G/DL (ref 3.5–4.8)
ALBUMIN SERPL BCP-MCNC: 1.8 G/DL (ref 3.5–4.8)
ALBUMIN SERPL BCP-MCNC: 1.9 G/DL (ref 3.5–4.8)
ALBUMIN/GLOB SERPL: 0.4 {RATIO} (ref 1–2)
ALBUMIN/GLOB SERPL: 0.5 {RATIO} (ref 1–2)
ALP SERPL-CCNC: 331 U/L (ref 32–100)
ALP SERPL-CCNC: 331 U/L (ref 32–100)
ALP SERPL-CCNC: 351 U/L (ref 32–100)
ALP SERPL-CCNC: 375 U/L (ref 32–100)
ALP SERPL-CCNC: 390 U/L (ref 32–100)
ALP SERPL-CCNC: 397 U/L (ref 32–100)
ALP SERPL-CCNC: 426 U/L (ref 32–100)
ALP SERPL-CCNC: 427 U/L (ref 32–100)
ALT SERPL-CCNC: 108 U/L (ref 14–54)
ALT SERPL-CCNC: 112 U/L (ref 14–54)
ALT SERPL-CCNC: 125 U/L (ref 14–54)
ALT SERPL-CCNC: 154 U/L (ref 14–54)
ALT SERPL-CCNC: 190 U/L (ref 14–54)
ALT SERPL-CCNC: 228 U/L (ref 14–54)
ALT SERPL-CCNC: 64 U/L (ref 14–54)
ALT SERPL-CCNC: 92 U/L (ref 14–54)
AMMONIA PLAS-MCNC: 117 UG/DL (ref 19.5–64.6)
AMMONIA PLAS-MCNC: 89 UG/DL (ref 19.5–64.6)
AMMONIA PLAS-MCNC: 94 UG/DL (ref 19.5–64.6)
AMMONIA PLAS-MCNC: 97 UG/DL (ref 19.5–64.6)
ANION GAP SERPL CALC-SCNC: 10 MMOL/L (ref 0–18)
ANION GAP SERPL CALC-SCNC: 14 MMOL/L (ref 0–18)
ANION GAP SERPL CALC-SCNC: 3 MMOL/L (ref 0–18)
ANION GAP SERPL CALC-SCNC: 3 MMOL/L (ref 0–18)
ANION GAP SERPL CALC-SCNC: 4 MMOL/L (ref 0–18)
ANION GAP SERPL CALC-SCNC: 5 MMOL/L (ref 0–18)
ANION GAP SERPL CALC-SCNC: 6 MMOL/L (ref 0–18)
ANION GAP SERPL CALC-SCNC: 6 MMOL/L (ref 0–18)
ANION GAP SERPL CALC-SCNC: 7 MMOL/L (ref 0–18)
ANION GAP SERPL CALC-SCNC: 7 MMOL/L (ref 0–18)
ANTIBODY SCREEN: NEGATIVE
AST SERPL-CCNC: 131 U/L (ref 15–41)
AST SERPL-CCNC: 42 U/L (ref 15–41)
AST SERPL-CCNC: 64 U/L (ref 15–41)
AST SERPL-CCNC: 74 U/L (ref 15–41)
AST SERPL-CCNC: 75 U/L (ref 15–41)
AST SERPL-CCNC: 76 U/L (ref 15–41)
AST SERPL-CCNC: 86 U/L (ref 15–41)
AST SERPL-CCNC: 94 U/L (ref 15–41)
BASOPHILS # BLD: 0 K/UL (ref 0–0.2)
BASOPHILS NFR BLD: 0 %
BILIRUB SERPL-MCNC: 2.6 MG/DL (ref 0.3–1.2)
BILIRUB SERPL-MCNC: 3.7 MG/DL (ref 0.3–1.2)
BILIRUB SERPL-MCNC: 4.1 MG/DL (ref 0.3–1.2)
BILIRUB SERPL-MCNC: 4.2 MG/DL (ref 0.3–1.2)
BILIRUB SERPL-MCNC: 4.8 MG/DL (ref 0.3–1.2)
BILIRUB SERPL-MCNC: 5.8 MG/DL (ref 0.3–1.2)
BILIRUB SERPL-MCNC: 8.6 MG/DL (ref 0.3–1.2)
BILIRUB SERPL-MCNC: 9.4 MG/DL (ref 0.3–1.2)
BILIRUB UR QL: NEGATIVE
BLOOD TYPE BARCODE: 7300
BLOOD TYPE BARCODE: 7300
BUN SERPL-MCNC: 11 MG/DL (ref 8–20)
BUN SERPL-MCNC: 12 MG/DL (ref 8–20)
BUN SERPL-MCNC: 16 MG/DL (ref 8–20)
BUN SERPL-MCNC: 19 MG/DL (ref 8–20)
BUN SERPL-MCNC: 23 MG/DL (ref 8–20)
BUN SERPL-MCNC: 27 MG/DL (ref 8–20)
BUN SERPL-MCNC: 30 MG/DL (ref 8–20)
BUN SERPL-MCNC: 33 MG/DL (ref 8–20)
BUN SERPL-MCNC: 6 MG/DL (ref 8–20)
BUN SERPL-MCNC: 6 MG/DL (ref 8–20)
BUN SERPL-MCNC: 7 MG/DL (ref 8–20)
BUN SERPL-MCNC: 9 MG/DL (ref 8–20)
BUN/CREAT SERPL: 15 (ref 10–20)
BUN/CREAT SERPL: 18.2 (ref 10–20)
BUN/CREAT SERPL: 18.9 (ref 10–20)
BUN/CREAT SERPL: 26.5 (ref 10–20)
BUN/CREAT SERPL: 30.6 (ref 10–20)
BUN/CREAT SERPL: 33.3 (ref 10–20)
BUN/CREAT SERPL: 34.8 (ref 10–20)
BUN/CREAT SERPL: 38 (ref 10–20)
BUN/CREAT SERPL: 41.5 (ref 10–20)
BUN/CREAT SERPL: 46 (ref 10–20)
BUN/CREAT SERPL: 48.5 (ref 10–20)
BUN/CREAT SERPL: 50 (ref 10–20)
CALCIUM SERPL-MCNC: 7.6 MG/DL (ref 8.5–10.5)
CALCIUM SERPL-MCNC: 7.8 MG/DL (ref 8.5–10.5)
CALCIUM SERPL-MCNC: 7.9 MG/DL (ref 8.5–10.5)
CALCIUM SERPL-MCNC: 8 MG/DL (ref 8.5–10.5)
CALCIUM SERPL-MCNC: 8 MG/DL (ref 8.5–10.5)
CALCIUM SERPL-MCNC: 8.1 MG/DL (ref 8.5–10.5)
CALCIUM SERPL-MCNC: 8.1 MG/DL (ref 8.5–10.5)
CALCIUM SERPL-MCNC: 8.2 MG/DL (ref 8.5–10.5)
CHLORIDE SERPL-SCNC: 101 MMOL/L (ref 95–110)
CHLORIDE SERPL-SCNC: 101 MMOL/L (ref 95–110)
CHLORIDE SERPL-SCNC: 102 MMOL/L (ref 95–110)
CHLORIDE SERPL-SCNC: 103 MMOL/L (ref 95–110)
CHLORIDE SERPL-SCNC: 103 MMOL/L (ref 95–110)
CHLORIDE SERPL-SCNC: 104 MMOL/L (ref 95–110)
CHLORIDE SERPL-SCNC: 105 MMOL/L (ref 95–110)
CHLORIDE SERPL-SCNC: 106 MMOL/L (ref 95–110)
CHLORIDE SERPL-SCNC: 107 MMOL/L (ref 95–110)
CHLORIDE SERPL-SCNC: 110 MMOL/L (ref 95–110)
CHLORIDE SERPL-SCNC: 98 MMOL/L (ref 95–110)
CHLORIDE SERPL-SCNC: 99 MMOL/L (ref 95–110)
CHOLEST SERPL-MCNC: 385 MG/DL (ref 110–200)
CO2 SERPL-SCNC: 23 MMOL/L (ref 22–32)
CO2 SERPL-SCNC: 24 MMOL/L (ref 22–32)
CO2 SERPL-SCNC: 26 MMOL/L (ref 22–32)
CO2 SERPL-SCNC: 29 MMOL/L (ref 22–32)
CO2 SERPL-SCNC: 30 MMOL/L (ref 22–32)
CO2 SERPL-SCNC: 32 MMOL/L (ref 22–32)
CO2 SERPL-SCNC: 32 MMOL/L (ref 22–32)
COLOR UR: YELLOW
CREAT SERPL-MCNC: 0.33 MG/DL (ref 0.5–1.5)
CREAT SERPL-MCNC: 0.34 MG/DL (ref 0.5–1.5)
CREAT SERPL-MCNC: 0.36 MG/DL (ref 0.5–1.5)
CREAT SERPL-MCNC: 0.36 MG/DL (ref 0.5–1.5)
CREAT SERPL-MCNC: 0.37 MG/DL (ref 0.5–1.5)
CREAT SERPL-MCNC: 0.38 MG/DL (ref 0.5–1.5)
CREAT SERPL-MCNC: 0.4 MG/DL (ref 0.5–1.5)
CREAT SERPL-MCNC: 0.46 MG/DL (ref 0.5–1.5)
CREAT SERPL-MCNC: 0.5 MG/DL (ref 0.5–1.5)
CREAT SERPL-MCNC: 0.65 MG/DL (ref 0.5–1.5)
CREAT SERPL-MCNC: 0.68 MG/DL (ref 0.5–1.5)
CREAT SERPL-MCNC: 0.79 MG/DL (ref 0.5–1.5)
EOSINOPHIL # BLD: 0 K/UL (ref 0–0.7)
EOSINOPHIL NFR BLD: 0 %
EOSINOPHIL NFR BLD: 1 %
EOSINOPHIL NFR BLD: 1 %
EOSINOPHIL NFR BLD: 2 %
EOSINOPHIL NFR BLD: 3 %
ERYTHROCYTE [DISTWIDTH] IN BLOOD BY AUTOMATED COUNT: 16.1 % (ref 11–15)
ERYTHROCYTE [DISTWIDTH] IN BLOOD BY AUTOMATED COUNT: 16.1 % (ref 11–15)
ERYTHROCYTE [DISTWIDTH] IN BLOOD BY AUTOMATED COUNT: 16.2 % (ref 11–15)
ERYTHROCYTE [DISTWIDTH] IN BLOOD BY AUTOMATED COUNT: 16.5 % (ref 11–15)
ERYTHROCYTE [DISTWIDTH] IN BLOOD BY AUTOMATED COUNT: 16.6 % (ref 11–15)
ERYTHROCYTE [DISTWIDTH] IN BLOOD BY AUTOMATED COUNT: 16.7 % (ref 11–15)
ERYTHROCYTE [DISTWIDTH] IN BLOOD BY AUTOMATED COUNT: 16.7 % (ref 11–15)
ERYTHROCYTE [DISTWIDTH] IN BLOOD BY AUTOMATED COUNT: 16.9 % (ref 11–15)
ERYTHROCYTE [DISTWIDTH] IN BLOOD BY AUTOMATED COUNT: 16.9 % (ref 11–15)
ERYTHROCYTE [DISTWIDTH] IN BLOOD BY AUTOMATED COUNT: 17.1 % (ref 11–15)
ERYTHROCYTE [DISTWIDTH] IN BLOOD BY AUTOMATED COUNT: 17.2 % (ref 11–15)
ERYTHROCYTE [DISTWIDTH] IN BLOOD BY AUTOMATED COUNT: 17.9 % (ref 11–15)
GLOBULIN PLAS-MCNC: 3.3 G/DL (ref 2.5–3.7)
GLOBULIN PLAS-MCNC: 3.4 G/DL (ref 2.5–3.7)
GLOBULIN PLAS-MCNC: 3.6 G/DL (ref 2.5–3.7)
GLOBULIN PLAS-MCNC: 3.7 G/DL (ref 2.5–3.7)
GLUCOSE SERPL-MCNC: 104 MG/DL (ref 70–99)
GLUCOSE SERPL-MCNC: 104 MG/DL (ref 70–99)
GLUCOSE SERPL-MCNC: 109 MG/DL (ref 70–99)
GLUCOSE SERPL-MCNC: 121 MG/DL (ref 70–99)
GLUCOSE SERPL-MCNC: 136 MG/DL (ref 70–99)
GLUCOSE SERPL-MCNC: 148 MG/DL (ref 70–99)
GLUCOSE SERPL-MCNC: 165 MG/DL (ref 70–99)
GLUCOSE SERPL-MCNC: 84 MG/DL (ref 70–99)
GLUCOSE SERPL-MCNC: 88 MG/DL (ref 70–99)
GLUCOSE SERPL-MCNC: 89 MG/DL (ref 70–99)
GLUCOSE SERPL-MCNC: 95 MG/DL (ref 70–99)
GLUCOSE SERPL-MCNC: 98 MG/DL (ref 70–99)
GLUCOSE UR-MCNC: 50 MG/DL
HCT VFR BLD AUTO: 19.9 % (ref 35–48)
HCT VFR BLD AUTO: 20.8 % (ref 35–48)
HCT VFR BLD AUTO: 22.6 % (ref 35–48)
HCT VFR BLD AUTO: 23.4 % (ref 35–48)
HCT VFR BLD AUTO: 24.3 % (ref 35–48)
HCT VFR BLD AUTO: 25.2 % (ref 35–48)
HCT VFR BLD AUTO: 26.1 % (ref 35–48)
HCT VFR BLD AUTO: 29.1 % (ref 35–48)
HCT VFR BLD AUTO: 29.6 % (ref 35–48)
HCT VFR BLD AUTO: 30.5 % (ref 35–48)
HCT VFR BLD AUTO: 30.9 % (ref 35–48)
HCT VFR BLD AUTO: 33.5 % (ref 35–48)
HCT VFR BLD AUTO: 34.8 % (ref 35–48)
HDLC SERPL-MCNC: 5 MG/DL
HGB BLD-MCNC: 10.1 G/DL (ref 12–16)
HGB BLD-MCNC: 10.3 G/DL (ref 12–16)
HGB BLD-MCNC: 10.6 G/DL (ref 12–16)
HGB BLD-MCNC: 11.4 G/DL (ref 12–16)
HGB BLD-MCNC: 12.1 G/DL (ref 12–16)
HGB BLD-MCNC: 6.9 G/DL (ref 12–16)
HGB BLD-MCNC: 7.4 G/DL (ref 12–16)
HGB BLD-MCNC: 8 G/DL (ref 12–16)
HGB BLD-MCNC: 8.1 G/DL (ref 12–16)
HGB BLD-MCNC: 8.2 G/DL (ref 12–16)
HGB BLD-MCNC: 8.6 G/DL (ref 12–16)
HGB BLD-MCNC: 9 G/DL (ref 12–16)
HGB BLD-MCNC: 9.9 G/DL (ref 12–16)
KETONES UR-MCNC: NEGATIVE MG/DL
LDH SERPL L TO P-CCNC: 664 U/L (ref 98–192)
LDLC SERPL CALC-MCNC: 353 MG/DL (ref 0–99)
LYMPHOCYTES # BLD: 0.1 K/UL (ref 1–4)
LYMPHOCYTES # BLD: 0.1 K/UL (ref 1–4)
LYMPHOCYTES # BLD: 0.2 K/UL (ref 1–4)
LYMPHOCYTES # BLD: 0.2 K/UL (ref 1–4)
LYMPHOCYTES # BLD: 0.3 K/UL (ref 1–4)
LYMPHOCYTES # BLD: 0.4 K/UL (ref 1–4)
LYMPHOCYTES # BLD: 0.4 K/UL (ref 1–4)
LYMPHOCYTES # BLD: 0.6 K/UL (ref 1–4)
LYMPHOCYTES NFR BLD: 1 %
LYMPHOCYTES NFR BLD: 1 %
LYMPHOCYTES NFR BLD: 2 %
LYMPHOCYTES NFR BLD: 29 %
LYMPHOCYTES NFR BLD: 6 %
LYMPHOCYTES NFR BLD: 68 %
LYMPHOCYTES NFR BLD: 91 %
LYMPHOCYTES NFR BLD: 92 %
LYMPHOCYTES NFR BLD: 97 %
MAGNESIUM SERPL-MCNC: 1.5 MG/DL (ref 1.8–2.5)
MAGNESIUM SERPL-MCNC: 1.6 MG/DL (ref 1.8–2.5)
MAGNESIUM SERPL-MCNC: 1.6 MG/DL (ref 1.8–2.5)
MAGNESIUM SERPL-MCNC: 1.7 MG/DL (ref 1.8–2.5)
MAGNESIUM SERPL-MCNC: 1.8 MG/DL (ref 1.8–2.5)
MAGNESIUM SERPL-MCNC: 1.9 MG/DL (ref 1.8–2.5)
MAGNESIUM SERPL-MCNC: 2 MG/DL (ref 1.8–2.5)
MAGNESIUM SERPL-MCNC: 2.1 MG/DL (ref 1.8–2.5)
MCH RBC QN AUTO: 33.6 PG (ref 27–32)
MCH RBC QN AUTO: 33.7 PG (ref 27–32)
MCH RBC QN AUTO: 33.8 PG (ref 27–32)
MCH RBC QN AUTO: 33.9 PG (ref 27–32)
MCH RBC QN AUTO: 33.9 PG (ref 27–32)
MCH RBC QN AUTO: 34 PG (ref 27–32)
MCH RBC QN AUTO: 34.3 PG (ref 27–32)
MCH RBC QN AUTO: 34.8 PG (ref 27–32)
MCHC RBC AUTO-ENTMCNC: 33.7 G/DL (ref 32–37)
MCHC RBC AUTO-ENTMCNC: 33.8 G/DL (ref 32–37)
MCHC RBC AUTO-ENTMCNC: 34 G/DL (ref 32–37)
MCHC RBC AUTO-ENTMCNC: 34.1 G/DL (ref 32–37)
MCHC RBC AUTO-ENTMCNC: 34.2 G/DL (ref 32–37)
MCHC RBC AUTO-ENTMCNC: 34.3 G/DL (ref 32–37)
MCHC RBC AUTO-ENTMCNC: 34.3 G/DL (ref 32–37)
MCHC RBC AUTO-ENTMCNC: 34.4 G/DL (ref 32–37)
MCHC RBC AUTO-ENTMCNC: 34.6 G/DL (ref 32–37)
MCHC RBC AUTO-ENTMCNC: 34.8 G/DL (ref 32–37)
MCHC RBC AUTO-ENTMCNC: 35.6 G/DL (ref 32–37)
MCHC RBC AUTO-ENTMCNC: 35.9 G/DL (ref 32–37)
MCV RBC AUTO: 100.5 FL (ref 80–100)
MCV RBC AUTO: 94.1 FL (ref 80–100)
MCV RBC AUTO: 97.6 FL (ref 80–100)
MCV RBC AUTO: 97.9 FL (ref 80–100)
MCV RBC AUTO: 98.2 FL (ref 80–100)
MCV RBC AUTO: 98.5 FL (ref 80–100)
MCV RBC AUTO: 98.7 FL (ref 80–100)
MCV RBC AUTO: 98.8 FL (ref 80–100)
MCV RBC AUTO: 99 FL (ref 80–100)
MCV RBC AUTO: 99 FL (ref 80–100)
MCV RBC AUTO: 99.1 FL (ref 80–100)
MCV RBC AUTO: 99.9 FL (ref 80–100)
METAMYELOCYTES # BLD MANUAL: 0.07 K/UL
METAMYELOCYTES # BLD MANUAL: 0.22 K/UL
METAMYELOCYTES NFR BLD: 2 %
METAMYELOCYTES NFR BLD: 4 %
MONOCYTES # BLD: 0 K/UL (ref 0–1)
MONOCYTES # BLD: 0.1 K/UL (ref 0–1)
MONOCYTES # BLD: 0.1 K/UL (ref 0–1)
MONOCYTES # BLD: 0.3 K/UL (ref 0–1)
MONOCYTES # BLD: 0.7 K/UL (ref 0–1)
MONOCYTES NFR BLD: 0 %
MONOCYTES NFR BLD: 1 %
MONOCYTES NFR BLD: 1 %
MONOCYTES NFR BLD: 2 %
MONOCYTES NFR BLD: 3 %
MONOCYTES NFR BLD: 3 %
MONOCYTES NFR BLD: 6 %
MONOCYTES NFR BLD: 7 %
MONOCYTES NFR BLD: 7 %
MRSA DNA SPEC QL NAA+PROBE: NEGATIVE
NEUTROPHILS # BLD AUTO: 0 K/UL (ref 1.8–7.7)
NEUTROPHILS # BLD AUTO: 0.1 K/UL (ref 1.8–7.7)
NEUTROPHILS # BLD AUTO: 1.1 K/UL (ref 1.8–7.7)
NEUTROPHILS # BLD AUTO: 11.3 K/UL (ref 1.8–7.7)
NEUTROPHILS # BLD AUTO: 4.8 K/UL (ref 1.8–7.7)
NEUTROPHILS # BLD AUTO: 9.2 K/UL (ref 1.8–7.7)
NEUTROPHILS # BLD AUTO: 9.9 K/UL (ref 1.8–7.7)
NEUTROPHILS NFR BLD: 0 %
NEUTROPHILS NFR BLD: 1 %
NEUTROPHILS NFR BLD: 2 %
NEUTROPHILS NFR BLD: 2 %
NEUTROPHILS NFR BLD: 25 %
NEUTROPHILS NFR BLD: 28 %
NEUTROPHILS NFR BLD: 3 %
NEUTROPHILS NFR BLD: 70 %
NEUTROPHILS NFR BLD: 79 %
NEUTROPHILS NFR BLD: 82 %
NEUTROPHILS NFR BLD: 96 %
NEUTS BAND NFR BLD: 1 %
NEUTS BAND NFR BLD: 14 %
NEUTS BAND NFR BLD: 17 %
NEUTS BAND NFR BLD: 21 %
NEUTS BAND NFR BLD: 31 %
NITRITE UR QL STRIP.AUTO: NEGATIVE
NONHDLC SERPL-MCNC: 380 MG/DL
NRBC BLD-RTO: 1 % (ref ?–1)
OSMOLALITY UR CALC.SUM OF ELEC: 278 MOSM/KG (ref 275–295)
OSMOLALITY UR CALC.SUM OF ELEC: 279 MOSM/KG (ref 275–295)
OSMOLALITY UR CALC.SUM OF ELEC: 279 MOSM/KG (ref 275–295)
OSMOLALITY UR CALC.SUM OF ELEC: 281 MOSM/KG (ref 275–295)
OSMOLALITY UR CALC.SUM OF ELEC: 283 MOSM/KG (ref 275–295)
OSMOLALITY UR CALC.SUM OF ELEC: 285 MOSM/KG (ref 275–295)
OSMOLALITY UR CALC.SUM OF ELEC: 289 MOSM/KG (ref 275–295)
OSMOLALITY UR CALC.SUM OF ELEC: 290 MOSM/KG (ref 275–295)
OSMOLALITY UR CALC.SUM OF ELEC: 291 MOSM/KG (ref 275–295)
OSMOLALITY UR CALC.SUM OF ELEC: 293 MOSM/KG (ref 275–295)
OSMOLALITY UR CALC.SUM OF ELEC: 294 MOSM/KG (ref 275–295)
OSMOLALITY UR CALC.SUM OF ELEC: 295 MOSM/KG (ref 275–295)
PH UR: 6 [PH] (ref 5–8)
PLATELET # BLD AUTO: 12 K/UL (ref 140–400)
PLATELET # BLD AUTO: 120 K/UL (ref 140–400)
PLATELET # BLD AUTO: 163 K/UL (ref 140–400)
PLATELET # BLD AUTO: 187 K/UL (ref 140–400)
PLATELET # BLD AUTO: 214 K/UL (ref 140–400)
PLATELET # BLD AUTO: 231 K/UL (ref 140–400)
PLATELET # BLD AUTO: 24 K/UL (ref 140–400)
PLATELET # BLD AUTO: 31 K/UL (ref 140–400)
PLATELET # BLD AUTO: 37 K/UL (ref 140–400)
PLATELET # BLD AUTO: 41 K/UL (ref 140–400)
PLATELET # BLD AUTO: 6 K/UL (ref 140–400)
PLATELET # BLD AUTO: 76 K/UL (ref 140–400)
PMV BLD AUTO: 7.6 FL (ref 7.4–10.3)
PMV BLD AUTO: 8.1 FL (ref 7.4–10.3)
PMV BLD AUTO: 8.1 FL (ref 7.4–10.3)
PMV BLD AUTO: 8.2 FL (ref 7.4–10.3)
PMV BLD AUTO: 8.3 FL (ref 7.4–10.3)
PMV BLD AUTO: 8.5 FL (ref 7.4–10.3)
PMV BLD AUTO: 8.6 FL (ref 7.4–10.3)
POTASSIUM SERPL-SCNC: 2.6 MMOL/L (ref 3.3–5.1)
POTASSIUM SERPL-SCNC: 3.2 MMOL/L (ref 3.3–5.1)
POTASSIUM SERPL-SCNC: 3.2 MMOL/L (ref 3.3–5.1)
POTASSIUM SERPL-SCNC: 3.3 MMOL/L (ref 3.3–5.1)
POTASSIUM SERPL-SCNC: 3.4 MMOL/L (ref 3.3–5.1)
POTASSIUM SERPL-SCNC: 3.5 MMOL/L (ref 3.3–5.1)
POTASSIUM SERPL-SCNC: 3.6 MMOL/L (ref 3.3–5.1)
POTASSIUM SERPL-SCNC: 3.7 MMOL/L (ref 3.3–5.1)
POTASSIUM SERPL-SCNC: 3.8 MMOL/L (ref 3.3–5.1)
POTASSIUM SERPL-SCNC: 4 MMOL/L (ref 3.3–5.1)
POTASSIUM SERPL-SCNC: 4.1 MMOL/L (ref 3.3–5.1)
PROCALCITONIN SERPL-MCNC: 5.55 NG/ML (ref ?–0.11)
PROT SERPL-MCNC: 4.8 G/DL (ref 5.9–8.4)
PROT SERPL-MCNC: 4.9 G/DL (ref 5.9–8.4)
PROT SERPL-MCNC: 5.2 G/DL (ref 5.9–8.4)
PROT SERPL-MCNC: 5.2 G/DL (ref 5.9–8.4)
PROT SERPL-MCNC: 5.4 G/DL (ref 5.9–8.4)
PROT SERPL-MCNC: 5.6 G/DL (ref 5.9–8.4)
PROT UR-MCNC: 30 MG/DL
RBC # BLD AUTO: 2.02 M/UL (ref 3.7–5.4)
RBC # BLD AUTO: 2.13 M/UL (ref 3.7–5.4)
RBC # BLD AUTO: 2.41 M/UL (ref 3.7–5.4)
RBC # BLD AUTO: 2.42 M/UL (ref 3.7–5.4)
RBC # BLD AUTO: 2.54 M/UL (ref 3.7–5.4)
RBC # BLD AUTO: 2.64 M/UL (ref 3.7–5.4)
RBC # BLD AUTO: 2.94 M/UL (ref 3.7–5.4)
RBC # BLD AUTO: 2.99 M/UL (ref 3.7–5.4)
RBC # BLD AUTO: 3.05 M/UL (ref 3.7–5.4)
RBC # BLD AUTO: 3.15 M/UL (ref 3.7–5.4)
RBC # BLD AUTO: 3.38 M/UL (ref 3.7–5.4)
RBC # BLD AUTO: 3.56 M/UL (ref 3.7–5.4)
RBC #/AREA URNS AUTO: 10 /HPF
RH BLOOD TYPE: POSITIVE
SODIUM SERPL-SCNC: 135 MMOL/L (ref 136–144)
SODIUM SERPL-SCNC: 136 MMOL/L (ref 136–144)
SODIUM SERPL-SCNC: 137 MMOL/L (ref 136–144)
SODIUM SERPL-SCNC: 138 MMOL/L (ref 136–144)
SODIUM SERPL-SCNC: 139 MMOL/L (ref 136–144)
SODIUM SERPL-SCNC: 139 MMOL/L (ref 136–144)
SODIUM SERPL-SCNC: 140 MMOL/L (ref 136–144)
SP GR UR STRIP: 1.03 (ref 1–1.03)
TRIGL SERPL-MCNC: 136 MG/DL (ref 1–149)
TROPONIN I SERPL-MCNC: 0.04 NG/ML (ref ?–0.03)
TROPONIN I SERPL-MCNC: 0.04 NG/ML (ref ?–0.03)
TSH SERPL-ACNC: 0.79 UIU/ML (ref 0.45–5.33)
URATE SERPL-MCNC: 7.3 MG/DL (ref 2.1–7.4)
UROBILINOGEN UR STRIP-ACNC: <2
VARIANT LYMPHS NFR BLD MANUAL: 4 %
VIT C UR-MCNC: 40 MG/DL
WBC # BLD AUTO: 0.2 K/UL (ref 4–11)
WBC # BLD AUTO: 0.3 K/UL (ref 4–11)
WBC # BLD AUTO: 0.4 K/UL (ref 4–11)
WBC # BLD AUTO: 0.5 K/UL (ref 4–11)
WBC # BLD AUTO: 1.5 K/UL (ref 4–11)
WBC # BLD AUTO: 1.8 K/UL (ref 4–11)
WBC # BLD AUTO: 10.8 K/UL (ref 4–11)
WBC # BLD AUTO: 11.9 K/UL (ref 4–11)
WBC # BLD AUTO: 5.2 K/UL (ref 4–11)
WBC # BLD AUTO: 9.3 K/UL (ref 4–11)
WBC #/AREA URNS AUTO: 9 /HPF

## 2018-01-01 PROCEDURE — 99253 IP/OBS CNSLTJ NEW/EST LOW 45: CPT | Performed by: NURSE PRACTITIONER

## 2018-01-01 PROCEDURE — 30233R1 TRANSFUSION OF NONAUTOLOGOUS PLATELETS INTO PERIPHERAL VEIN, PERCUTANEOUS APPROACH: ICD-10-PCS | Performed by: HOSPITALIST

## 2018-01-01 PROCEDURE — 88342 IMHCHEM/IMCYTCHM 1ST ANTB: CPT | Performed by: INTERNAL MEDICINE

## 2018-01-01 PROCEDURE — 80069 RENAL FUNCTION PANEL: CPT

## 2018-01-01 PROCEDURE — 0FB23ZX EXCISION OF LEFT LOBE LIVER, PERCUTANEOUS APPROACH, DIAGNOSTIC: ICD-10-PCS | Performed by: RADIOLOGY

## 2018-01-01 PROCEDURE — 74177 CT ABD & PELVIS W/CONTRAST: CPT | Performed by: SURGERY

## 2018-01-01 PROCEDURE — 80074 ACUTE HEPATITIS PANEL: CPT

## 2018-01-01 PROCEDURE — 82088 ASSAY OF ALDOSTERONE: CPT | Performed by: INTERNAL MEDICINE

## 2018-01-01 PROCEDURE — 71045 X-RAY EXAM CHEST 1 VIEW: CPT | Performed by: HOSPITALIST

## 2018-01-01 PROCEDURE — 85027 COMPLETE CBC AUTOMATED: CPT | Performed by: RADIOLOGY

## 2018-01-01 PROCEDURE — 74177 CT ABD & PELVIS W/CONTRAST: CPT | Performed by: INTERNAL MEDICINE

## 2018-01-01 PROCEDURE — 30233N1 TRANSFUSION OF NONAUTOLOGOUS RED BLOOD CELLS INTO PERIPHERAL VEIN, PERCUTANEOUS APPROACH: ICD-10-PCS | Performed by: HOSPITALIST

## 2018-01-01 PROCEDURE — 99232 SBSQ HOSP IP/OBS MODERATE 35: CPT | Performed by: NURSE PRACTITIONER

## 2018-01-01 PROCEDURE — 88333 PATH CONSLTJ SURG CYTO XM 1: CPT | Performed by: INTERNAL MEDICINE

## 2018-01-01 PROCEDURE — 99231 SBSQ HOSP IP/OBS SF/LOW 25: CPT | Performed by: NURSE PRACTITIONER

## 2018-01-01 PROCEDURE — 88307 TISSUE EXAM BY PATHOLOGIST: CPT | Performed by: INTERNAL MEDICINE

## 2018-01-01 PROCEDURE — 85610 PROTHROMBIN TIME: CPT

## 2018-01-01 PROCEDURE — 76700 US EXAM ABDOM COMPLETE: CPT | Performed by: INTERNAL MEDICINE

## 2018-01-01 PROCEDURE — 93306 TTE W/DOPPLER COMPLETE: CPT | Performed by: INTERNAL MEDICINE

## 2018-01-01 PROCEDURE — 71046 X-RAY EXAM CHEST 2 VIEWS: CPT | Performed by: INTERNAL MEDICINE

## 2018-01-01 PROCEDURE — BF45ZZZ ULTRASONOGRAPHY OF LIVER: ICD-10-PCS | Performed by: RADIOLOGY

## 2018-01-01 PROCEDURE — 47000 NEEDLE BIOPSY OF LIVER PERQ: CPT

## 2018-01-01 PROCEDURE — 99152 MOD SED SAME PHYS/QHP 5/>YRS: CPT

## 2018-01-01 PROCEDURE — 88341 IMHCHEM/IMCYTCHM EA ADD ANTB: CPT | Performed by: INTERNAL MEDICINE

## 2018-01-01 PROCEDURE — 36415 COLL VENOUS BLD VENIPUNCTURE: CPT

## 2018-01-01 PROCEDURE — 85610 PROTHROMBIN TIME: CPT | Performed by: RADIOLOGY

## 2018-01-01 PROCEDURE — 84132 ASSAY OF SERUM POTASSIUM: CPT

## 2018-01-01 PROCEDURE — A4216 STERILE WATER/SALINE, 10 ML: HCPCS | Performed by: HOSPITALIST

## 2018-01-01 PROCEDURE — 3E04305 INTRODUCTION OF OTHER ANTINEOPLASTIC INTO CENTRAL VEIN, PERCUTANEOUS APPROACH: ICD-10-PCS | Performed by: HOSPITALIST

## 2018-01-01 PROCEDURE — 76942 ECHO GUIDE FOR BIOPSY: CPT

## 2018-01-01 RX ORDER — METOCLOPRAMIDE HYDROCHLORIDE 5 MG/ML
10 INJECTION INTRAMUSCULAR; INTRAVENOUS EVERY 8 HOURS PRN
Status: DISCONTINUED | OUTPATIENT
Start: 2018-01-01 | End: 2018-01-01

## 2018-01-01 RX ORDER — SODIUM CHLORIDE 9 MG/ML
INJECTION, SOLUTION INTRAVENOUS
Status: COMPLETED
Start: 2018-01-01 | End: 2018-01-01

## 2018-01-01 RX ORDER — SODIUM CHLORIDE 0.9 % (FLUSH) 0.9 %
3 SYRINGE (ML) INJECTION AS NEEDED
Status: DISCONTINUED | OUTPATIENT
Start: 2018-01-01 | End: 2018-01-01

## 2018-01-01 RX ORDER — POLYETHYLENE GLYCOL 3350 17 G/17G
17 POWDER, FOR SOLUTION ORAL DAILY PRN
Status: DISCONTINUED | OUTPATIENT
Start: 2018-01-01 | End: 2018-01-01

## 2018-01-01 RX ORDER — SODIUM CHLORIDE 0.9 % (FLUSH) 0.9 %
10 SYRINGE (ML) INJECTION AS NEEDED
Status: DISCONTINUED | OUTPATIENT
Start: 2018-01-01 | End: 2018-01-01

## 2018-01-01 RX ORDER — LACTULOSE 10 G/15ML
20 SOLUTION ORAL 3 TIMES DAILY
Status: DISCONTINUED | OUTPATIENT
Start: 2018-01-01 | End: 2018-01-01

## 2018-01-01 RX ORDER — DILTIAZEM HYDROCHLORIDE 5 MG/ML
5 INJECTION INTRAVENOUS ONCE
Status: DISCONTINUED | OUTPATIENT
Start: 2018-01-01 | End: 2018-01-01

## 2018-01-01 RX ORDER — FUROSEMIDE 10 MG/ML
40 INJECTION INTRAMUSCULAR; INTRAVENOUS ONCE
Status: COMPLETED | OUTPATIENT
Start: 2018-01-01 | End: 2018-01-01

## 2018-01-01 RX ORDER — ONDANSETRON 2 MG/ML
4 INJECTION INTRAMUSCULAR; INTRAVENOUS EVERY 6 HOURS PRN
Status: DISCONTINUED | OUTPATIENT
Start: 2018-01-01 | End: 2018-01-01

## 2018-01-01 RX ORDER — ACETAMINOPHEN 160 MG/5ML
650 SOLUTION ORAL EVERY 6 HOURS PRN
Status: DISCONTINUED | OUTPATIENT
Start: 2018-01-01 | End: 2018-01-01

## 2018-01-01 RX ORDER — SODIUM CHLORIDE 9 MG/ML
INJECTION, SOLUTION INTRAVENOUS
Status: DISCONTINUED
Start: 2018-01-01 | End: 2018-01-01

## 2018-01-01 RX ORDER — ATROPINE SULFATE 10 MG/ML
2 SOLUTION/ DROPS OPHTHALMIC EVERY 2 HOUR PRN
Status: DISCONTINUED | OUTPATIENT
Start: 2018-01-01 | End: 2018-01-01

## 2018-01-01 RX ORDER — SODIUM CHLORIDE 9 MG/ML
INJECTION, SOLUTION INTRAVENOUS ONCE
Status: DISCONTINUED | OUTPATIENT
Start: 2018-01-01 | End: 2018-01-01

## 2018-01-01 RX ORDER — MAGNESIUM OXIDE 400 MG (241.3 MG MAGNESIUM) TABLET
400 TABLET ONCE
Status: COMPLETED | OUTPATIENT
Start: 2018-01-01 | End: 2018-01-01

## 2018-01-01 RX ORDER — LACTULOSE 10 G/15ML
20 SOLUTION ORAL DAILY
Status: DISCONTINUED | OUTPATIENT
Start: 2018-01-01 | End: 2018-01-01

## 2018-01-01 RX ORDER — METOCLOPRAMIDE 10 MG/1
10 TABLET ORAL EVERY 6 HOURS PRN
Status: DISCONTINUED | OUTPATIENT
Start: 2018-01-01 | End: 2018-01-01

## 2018-01-01 RX ORDER — POTASSIUM CHLORIDE 29.8 MG/ML
40 INJECTION INTRAVENOUS ONCE
Status: COMPLETED | OUTPATIENT
Start: 2018-01-01 | End: 2018-01-01

## 2018-01-01 RX ORDER — SODIUM CHLORIDE 9 MG/ML
INJECTION, SOLUTION INTRAVENOUS CONTINUOUS
Status: DISCONTINUED | OUTPATIENT
Start: 2018-01-01 | End: 2018-01-01

## 2018-01-01 RX ORDER — ACETAMINOPHEN 650 MG/1
650 SUPPOSITORY RECTAL EVERY 6 HOURS PRN
Status: DISCONTINUED | OUTPATIENT
Start: 2018-01-01 | End: 2018-01-01

## 2018-01-01 RX ORDER — 0.9 % SODIUM CHLORIDE 0.9 %
VIAL (ML) INJECTION
Status: COMPLETED
Start: 2018-01-01 | End: 2018-01-01

## 2018-01-01 RX ORDER — FUROSEMIDE 10 MG/ML
20 INJECTION INTRAMUSCULAR; INTRAVENOUS ONCE
Status: COMPLETED | OUTPATIENT
Start: 2018-01-01 | End: 2018-01-01

## 2018-01-01 RX ORDER — HALOPERIDOL 5 MG/ML
2 INJECTION INTRAMUSCULAR
Status: DISCONTINUED | OUTPATIENT
Start: 2018-01-01 | End: 2018-01-01

## 2018-01-01 RX ORDER — LIDOCAINE HYDROCHLORIDE 20 MG/ML
INJECTION, SOLUTION EPIDURAL; INFILTRATION; INTRACAUDAL; PERINEURAL
Status: COMPLETED
Start: 2018-01-01 | End: 2018-01-01

## 2018-01-01 RX ORDER — METRONIDAZOLE 500 MG/100ML
500 INJECTION, SOLUTION INTRAVENOUS EVERY 8 HOURS
Status: DISCONTINUED | OUTPATIENT
Start: 2018-01-01 | End: 2018-01-01

## 2018-01-01 RX ORDER — LORAZEPAM 2 MG/ML
2 INJECTION INTRAMUSCULAR EVERY 4 HOURS PRN
Status: DISCONTINUED | OUTPATIENT
Start: 2018-01-01 | End: 2018-01-01

## 2018-01-01 RX ORDER — SODIUM CHLORIDE 9 MG/ML
INJECTION, SOLUTION INTRAVENOUS ONCE
Status: COMPLETED | OUTPATIENT
Start: 2018-01-01 | End: 2018-01-01

## 2018-01-01 RX ORDER — MORPHINE SULFATE 2 MG/ML
1 INJECTION, SOLUTION INTRAMUSCULAR; INTRAVENOUS EVERY 2 HOUR PRN
Status: DISCONTINUED | OUTPATIENT
Start: 2018-01-01 | End: 2018-01-01

## 2018-01-01 RX ORDER — MAGNESIUM SULFATE 1 G/100ML
1 INJECTION INTRAVENOUS ONCE
Status: COMPLETED | OUTPATIENT
Start: 2018-01-01 | End: 2018-01-01

## 2018-01-01 RX ORDER — HEPARIN SODIUM 5000 [USP'U]/ML
5000 INJECTION, SOLUTION INTRAVENOUS; SUBCUTANEOUS EVERY 8 HOURS SCHEDULED
Status: DISCONTINUED | OUTPATIENT
Start: 2018-01-01 | End: 2018-01-01

## 2018-01-01 RX ORDER — FUROSEMIDE 40 MG/1
40 TABLET ORAL EVERY 8 HOURS PRN
Status: DISCONTINUED | OUTPATIENT
Start: 2018-01-01 | End: 2018-01-01

## 2018-01-01 RX ORDER — POTASSIUM CHLORIDE 14.9 MG/ML
20 INJECTION INTRAVENOUS ONCE
Status: COMPLETED | OUTPATIENT
Start: 2018-01-01 | End: 2018-01-01

## 2018-01-01 RX ORDER — LORAZEPAM 2 MG/ML
1 INJECTION INTRAMUSCULAR EVERY 4 HOURS PRN
Status: DISCONTINUED | OUTPATIENT
Start: 2018-01-01 | End: 2018-01-01

## 2018-01-01 RX ORDER — GLYCOPYRROLATE 0.2 MG/ML
0.4 INJECTION, SOLUTION INTRAMUSCULAR; INTRAVENOUS
Status: DISCONTINUED | OUTPATIENT
Start: 2018-01-01 | End: 2018-01-01

## 2018-01-01 RX ORDER — HALOPERIDOL 5 MG/ML
1 INJECTION INTRAMUSCULAR
Status: DISCONTINUED | OUTPATIENT
Start: 2018-01-01 | End: 2018-01-01

## 2018-01-01 RX ORDER — POTASSIUM CHLORIDE 20 MEQ/1
40 TABLET, EXTENDED RELEASE ORAL ONCE
Status: COMPLETED | OUTPATIENT
Start: 2018-01-01 | End: 2018-01-01

## 2018-01-01 RX ORDER — METOPROLOL SUCCINATE 25 MG/1
25 TABLET, EXTENDED RELEASE ORAL
Status: DISCONTINUED | OUTPATIENT
Start: 2018-01-01 | End: 2018-01-01

## 2018-01-01 RX ORDER — MAGNESIUM SULFATE HEPTAHYDRATE 40 MG/ML
2 INJECTION, SOLUTION INTRAVENOUS ONCE
Status: COMPLETED | OUTPATIENT
Start: 2018-01-01 | End: 2018-01-01

## 2018-01-01 RX ORDER — ARIPIPRAZOLE 15 MG/1
40 TABLET ORAL ONCE
Status: COMPLETED | OUTPATIENT
Start: 2018-01-01 | End: 2018-01-01

## 2018-01-01 RX ORDER — ACETAMINOPHEN 325 MG/1
650 TABLET ORAL EVERY 6 HOURS PRN
Status: DISCONTINUED | OUTPATIENT
Start: 2018-01-01 | End: 2018-01-01

## 2018-01-01 RX ORDER — SCOLOPAMINE TRANSDERMAL SYSTEM 1 MG/1
1 PATCH, EXTENDED RELEASE TRANSDERMAL
Status: DISCONTINUED | OUTPATIENT
Start: 2018-01-01 | End: 2018-01-01

## 2018-01-01 RX ORDER — FUROSEMIDE 10 MG/ML
40 INJECTION INTRAMUSCULAR; INTRAVENOUS EVERY 8 HOURS PRN
Status: DISCONTINUED | OUTPATIENT
Start: 2018-01-01 | End: 2018-01-01

## 2018-01-01 RX ORDER — ARIPIPRAZOLE 15 MG/1
40 TABLET ORAL EVERY 4 HOURS
Status: DISCONTINUED | OUTPATIENT
Start: 2018-01-01 | End: 2018-01-01

## 2018-01-01 RX ORDER — ALLOPURINOL 100 MG/1
100 TABLET ORAL DAILY
Status: DISCONTINUED | OUTPATIENT
Start: 2018-01-01 | End: 2018-01-01

## 2018-01-01 RX ORDER — LORAZEPAM 2 MG/ML
0.5 INJECTION INTRAMUSCULAR EVERY 6 HOURS PRN
Status: DISCONTINUED | OUTPATIENT
Start: 2018-01-01 | End: 2018-01-01

## 2018-01-01 RX ORDER — BISACODYL 10 MG
10 SUPPOSITORY, RECTAL RECTAL
Status: DISCONTINUED | OUTPATIENT
Start: 2018-01-01 | End: 2018-01-01

## 2018-01-01 RX ORDER — ONDANSETRON 4 MG/1
4 TABLET, ORALLY DISINTEGRATING ORAL EVERY 6 HOURS PRN
Status: DISCONTINUED | OUTPATIENT
Start: 2018-01-01 | End: 2018-01-01

## 2018-01-01 RX ORDER — POTASSIUM CHLORIDE 20 MEQ/1
40 TABLET, EXTENDED RELEASE ORAL EVERY 4 HOURS
Status: COMPLETED | OUTPATIENT
Start: 2018-01-01 | End: 2018-01-01

## 2018-01-01 RX ORDER — SODIUM CHLORIDE 9 MG/ML
INJECTION, SOLUTION INTRAVENOUS
Status: DISPENSED
Start: 2018-01-01 | End: 2018-01-01

## 2018-01-01 RX ORDER — MORPHINE SULFATE 2 MG/ML
1 INJECTION, SOLUTION INTRAMUSCULAR; INTRAVENOUS
Status: DISCONTINUED | OUTPATIENT
Start: 2018-01-01 | End: 2018-01-01

## 2018-01-01 RX ORDER — MIDAZOLAM HYDROCHLORIDE 1 MG/ML
INJECTION INTRAMUSCULAR; INTRAVENOUS
Status: DISCONTINUED
Start: 2018-01-01 | End: 2018-01-01

## 2018-01-01 RX ORDER — DILTIAZEM HYDROCHLORIDE 120 MG/1
120 CAPSULE, COATED, EXTENDED RELEASE ORAL DAILY
Status: DISCONTINUED | OUTPATIENT
Start: 2018-01-01 | End: 2018-01-01

## 2018-01-01 RX ORDER — POTASSIUM CHLORIDE 14.9 MG/ML
20 INJECTION INTRAVENOUS ONCE
Status: DISCONTINUED | OUTPATIENT
Start: 2018-01-01 | End: 2018-01-01

## 2018-01-01 RX ORDER — MORPHINE SULFATE 4 MG/ML
4 INJECTION, SOLUTION INTRAMUSCULAR; INTRAVENOUS EVERY 2 HOUR PRN
Status: DISCONTINUED | OUTPATIENT
Start: 2018-01-01 | End: 2018-01-01

## 2018-01-01 RX ORDER — LORAZEPAM 2 MG/ML
0.5 INJECTION INTRAMUSCULAR EVERY 4 HOURS PRN
Status: DISCONTINUED | OUTPATIENT
Start: 2018-01-01 | End: 2018-01-01

## 2018-01-01 RX ORDER — IPRATROPIUM BROMIDE AND ALBUTEROL SULFATE 2.5; .5 MG/3ML; MG/3ML
3 SOLUTION RESPIRATORY (INHALATION) EVERY 6 HOURS PRN
Status: DISCONTINUED | OUTPATIENT
Start: 2018-01-01 | End: 2018-01-01

## 2018-01-01 RX ORDER — METOCLOPRAMIDE HYDROCHLORIDE 5 MG/ML
10 INJECTION INTRAMUSCULAR; INTRAVENOUS EVERY 6 HOURS PRN
Status: DISCONTINUED | OUTPATIENT
Start: 2018-01-01 | End: 2018-01-01

## 2018-01-01 RX ORDER — MORPHINE SULFATE 2 MG/ML
2 INJECTION, SOLUTION INTRAMUSCULAR; INTRAVENOUS EVERY 2 HOUR PRN
Status: DISCONTINUED | OUTPATIENT
Start: 2018-01-01 | End: 2018-01-01

## 2018-01-01 RX ORDER — LEVOFLOXACIN 5 MG/ML
750 INJECTION, SOLUTION INTRAVENOUS EVERY 24 HOURS
Status: DISCONTINUED | OUTPATIENT
Start: 2018-01-01 | End: 2018-01-01

## 2018-01-01 RX ORDER — MAGNESIUM OXIDE 400 MG (241.3 MG MAGNESIUM) TABLET
2 TABLET NIGHTLY
Status: DISCONTINUED | OUTPATIENT
Start: 2018-01-01 | End: 2018-01-01

## 2018-01-01 RX ORDER — ALPRAZOLAM 0.25 MG/1
0.25 TABLET ORAL 4 TIMES DAILY PRN
Status: DISCONTINUED | OUTPATIENT
Start: 2018-01-01 | End: 2018-01-01

## 2018-01-01 RX ORDER — FUROSEMIDE 10 MG/ML
40 INJECTION INTRAMUSCULAR; INTRAVENOUS DAILY
Status: COMPLETED | OUTPATIENT
Start: 2018-01-01 | End: 2018-01-01

## 2018-10-08 PROBLEM — R53.83 OTHER FATIGUE: Status: ACTIVE | Noted: 2018-01-01

## 2018-10-22 NOTE — PROGRESS NOTES
Pt is able to sit up  without difficulty. Pt voided and tolerated fluids and food. Procedural site remains dry and intact with good circulation, motion, and sensation. No signs and symptoms of bleeding/hematoma noted.    Instruction provided, patient/

## 2018-10-22 NOTE — H&P
[facility] [facility] - [facility]   History & Physical    Loman Rinne Patient Status:  Outpatient in a Bed    1950 MRN [id number]   Location [facility] Attending Yanna Bone, [address] [facility] Day # 0 PCP BRUCE THORPE 84   Resp 15   Wt 235 lb   SpO2 94%   BMI 41.63 kg/m²     General: NAD  Neck: No JVD  Lungs: CTA bilat  Heart: RRR, S1, S2  Abdomen: Soft, NT/ND, BS+x4  Extremities: Warm, dry, no LE edema bilat      Results:   Labs:  Recent Labs   Lab  10/22/18   0926   R

## 2018-10-22 NOTE — PRE-SEDATION ASSESSMENT
[facility]  IR Pre-Procedure Sedation Assessment    History of snoring or sleep or apnea?    No    History of previous problems with anesthesia or sedation  No    Physical Findings:  Neck: nl ROM  CV: RRR  PULM: normal respiratory rate/effor

## 2018-10-22 NOTE — PROCEDURES
[facility] - [facility]  Procedure Note    Magalie Sage Patient Status:  Outpatient in a Bed    1950 MRN [id number]   Location [facility] Attending Lulu Fuentes MD   Hosp Day # 0 PCP BRUCE Bhagat

## 2018-10-23 PROBLEM — C34.90 SMALL CELL LUNG CANCER (HCC): Status: ACTIVE | Noted: 2018-01-01

## 2018-11-01 PROBLEM — R17 JAUNDICE: Status: ACTIVE | Noted: 2018-01-01

## 2018-11-01 PROBLEM — N17.9 ACUTE KIDNEY INJURY (HCC): Status: ACTIVE | Noted: 2018-01-01

## 2018-11-01 PROBLEM — E87.6 HYPOKALEMIA: Status: ACTIVE | Noted: 2018-01-01

## 2018-11-01 PROBLEM — C78.7 LIVER METASTASES (HCC): Status: ACTIVE | Noted: 2018-01-01

## 2018-11-01 PROBLEM — R79.89 AZOTEMIA: Status: ACTIVE | Noted: 2018-01-01

## 2018-11-01 NOTE — ED NOTES
Pt c/o burning at infusion site. IV infusing well, no signs of infiltrate. Rate decreased to 20ml/hr. Colleen Villalba notified.

## 2018-11-01 NOTE — ED NOTES
Pt presents for abnormal labs. Pt recently diagnosed on 10/23 with cancer to the liver and lung. Pt had port placed to Left anterior chest wall last Friday.  Pt reports feeling dizzy since Monday, saw her doctor today who did lab work and found abnormalitie

## 2018-11-01 NOTE — ED INITIAL ASSESSMENT (HPI)
Pt sent from Dr. Parada Ohs office for further eval of elevated liver enzymes. Pt reports dizziness x1 day. Denies CP or abdominal pain.

## 2018-11-01 NOTE — H&P
[facility] Hospitalist H&P       CC: Patient presents with:  Abnormal Result (metabolic, cardiac)  Dizziness (neurologic)       PCP: Casimiro Horowitz MD    ASSESSMENT / PLAN:   Patient is a 79year old female with PMH sig for recently diagnosed small cell lung c number: [phone]    HPI       History of Present Illness: Patient is a 79year old female with PMH sig for recently diagnosed small cell lung ca with metastasis to the liver recently dx 10/22, obesity- BMI 41, hypothyroidism, OA, and peripheral edema w Alcohol/week: 0.5 oz      Types: 1 Glasses of wine per week      Comment: socially       Fam Hx  Family History   Problem Relation Age of Onset   • Ovarian Cancer Maternal Grandmother    • Breast Cancer Paternal Aunt    • Diabetes Father    • Heart Disorde HGB  13.4   MCV  93.6   PLT  299       Recent Labs   Lab  11/01/18   1102   NA  136   K  2.60*   CL  94*   CO2  29.6   BUN  41*   CREATSERUM  1.32*   GLU  144*       Recent Labs   Lab  11/01/18   1102   ALT  275*   AST  168*   ALB  2.0*   LDH  800*

## 2018-11-02 NOTE — PLAN OF CARE
Patient Centered Care    • Patient preferences are identified and integrated in the patient's plan of care Progressing        Patient/Family Goals    • Patient/Family Long Term Goal Progressing    • Patient/Family Short Term Goal Progressing        Continu

## 2018-11-02 NOTE — PAYOR COMM NOTE
--------------  ADMISSION REVIEW     PayorSargeliatone Hammond PP  Subscriber #:  [id number]  Authorization Number: [id number]    Admit date: 11/1/18  Admit time: 2044       Admitting Physician: Edi Wellington DO  Attending Physician:  Pablito Mendoza MD  Primary Care [facility]       Medications :   Ondansetron HCl (ZOFRAN) 8 MG tablet,  Take 1 tablet (8 mg total) by mouth every 8 (eight) hours as needed for Nausea.    Prochlorperazine Maleate (COMPAZINE) 10 mg tablet,  Take 1 tablet by mouth every 6 hours as needed for naus PERRLA, EOMI, conj sclera clear  THROAT: mmm, no lesions  NECK: supple, no meningeal signs  LUNGS: no resp distress, cta bilateral  CARDIO: RRR without murmur  GI: soft, mild ruq epigastric tenderenss  EXTREMITIES: from, 5/5 strength in all 4 ext, no edema 11/2/2018  CONCLUSION:  1. Unfavorable change from October 8, 2018 with right middle and right lower lobe consolidation/atelectasis suggesting occlusion of bronchus intermedius. 2. Borderline cardiomegaly. 3. Atherosclerosis.  4. Prominent hilum and mediast Medication List        Present on Admission  Date Reviewed: 10/16/2018          ICD-10-CM Noted POA    * (Principal) Jaundice R17 11/1/2018 Unknown    Acute kidney injury ([facility] [address] N17.9 11/1/2018 Yes    Azotemia R79.89 11/1/2018 Yes    Hypokalemia E87.6 11/1/ present due to known perihilar mass    SHERRILL   -creat 1.3 from baseline 0.7  -poss hypovolemia from poor fluid intake and concurrent diuretic  -hydrate with NS IVF  -hold lasix for now    Hyperammonemia  -with assoc mild encephalopathy  -due to liver mets ab REPLACEMENT Right 6/26/2017    Performed by Dandre Velazquez MD at [facility] MAIN OR   • ADILSON NEEDLE LOCALIZATION W/ SPECIMEN 1 SITE LEFT      benign   • ADILSON NEEDLE LOCALIZATION W/ SPECIMEN 1 SITE RIGHT      benign   • OTHER      lower right jaw oral surgery   • OT normal, no murmur, rub or gallop appreciated   Abdomen:   Soft, non-tender.  No masses, Non distended, obese abdomen exam limited   Extremities: Extremities normal, atraumatic, no cyanosis, +1 b/l LE edema   Skin: Scleral icterus, some ecchymosis near port Jose Rodriguez RN    11/1/2018 2240 Given 5000 Units Subcutaneous (Left Lower Abdomen) Jose Rodriguez RN      lactulose [facility]) 10 GM/15ML solution 20 g     Date Action Dose Route User    11/2/2018 0969 Given 20 g Oral Kaila Serna RN    11/1 User    11/2/2018 0954 New Bag (none) Intravenous Ole Griffith, RN      sodium chloride 0.9 % infusion     Date Action Dose Route User    11/2/2018 0319 New Bag 250 mL (none) Katia Gutierrez RN        11/2 CARDIOLOGY CONSULT NOTE  Assessment/Plan:

## 2018-11-02 NOTE — CM/SW NOTE
GRADY met with the pt. And her wife At bedside. The pt. Lives with her wife and dtr. Who has recently graduated from college. The pt. Reports needing assist with adls, and her wife and dtr. Help. The pt. Has been using a cane to assist with ambulation.   Nataly Diaz

## 2018-11-02 NOTE — PROGRESS NOTES
Date: 11/1/18     Drug name and dosage administered: Carboplatin 480mg; Etoposide 210mg    Drug Sequence: Carboplatin followed by Etoposide    Location of intravenous site (e.g. Central line, peripheral line): Left Chest Port    Needle size, type, location

## 2018-11-02 NOTE — PHYSICAL THERAPY NOTE
Order received, chart reviewed. Patient currently down for cardiac testing. Will check back at a later time. Patient reports being up and down to bathroom and is too tired, requesting we return tomorrow.

## 2018-11-02 NOTE — CONSULTS
Pulmonary H&P/Consult     NAME: Ricky Anderson - ROOM: [address] [place], [place] [address]A - MRN: [id number] - Age: 79year old - :  1950    Date of Admission: 2018  4:22 PM  Admission Diagnosis: Hypokalemia [E87.6]  Jaundice [R17]  Liver metastases ([address] [C78.7] SPECIMEN 1 SITE LEFT      benign   • ADILSON NEEDLE LOCALIZATION W/ SPECIMEN 1 SITE RIGHT      benign   • OTHER      lower right jaw oral surgery   • OTHER      cyst right wrist   • TOTAL KNEE REPLACEMENT Right 06/26/2017    Reshma     Family History   Problem positive BS.    Extremity: no edema    LABS/STUDIES: Reviewed in EPIC  Recent Labs   Lab  11/02/18   0746   RBC  3.56*   HGB  12.1   HCT  34.8*   MCV  97.9   MCH  33.8*   MCHC  34.6   RDW  16.2*   WBC  10.8   PLT  231     Recent Labs   Lab  11/01/18   1102

## 2018-11-02 NOTE — OCCUPATIONAL THERAPY NOTE
OCCUPATIONAL THERAPY EVALUATION - INPATIENT     Room Number: 450/450-A  Evaluation Date: 11/2/2018  Type of Evaluation: Initial       Physician Order: IP Consult to Occupational Therapy  Reason for Therapy: ADL/IADL Dysfunction and Discharge Planning    OC RECOMMENDATIONS  OT Discharge Recommendations: 24 hour care/supervision;Home with home health PT/OT  OT Device Recommendations: 3-in-1 commode; Shower chair    PLAN  OT Treatment Plan: Balance activities; Energy conservation/work simplification techniques;AD Patient's wife Dima Jimenez provides SBA/SPV as needed throughout the day, patient was able to manage toileting and dressing tasks; sponge bathes only; spouse was providing initial SPV for tub transfers; lives with spouse and daughter who both work during day; Little  -   Bathing (including washing, rinsing, drying)?: A Little  -   Toileting, which includes using toilet, bedpan or urinal? : A Lot  -   Putting on and taking off regular upper body clothing?: A Little  -   Taking care of personal grooming such as b

## 2018-11-02 NOTE — DIETARY NOTE
ADULT NUTRITION INITIAL ASSESSMENT    Pt is at moderate nutrition risk. Pt does not meet malnutrition criteria.       RECOMMENDATIONS TO MD:  See Nutrition Intervention     NUTRITION DIAGNOSIS/PROBLEM:  Inadequate oral intake related to decreased ability (232 lb 4.8 oz)  10/29/18 : 104.3 kg (230 lb)  10/24/18 : 106.3 kg (234 lb 7 oz)  10/22/18 : 106.3 kg (234 lb 6.4 oz)  10/19/18 : 106.6 kg (235 lb)  10/17/18 : 106.6 kg (235 lb)  10/08/18 : 108 kg (238 lb 3.2 oz)  08/10/18 : 108.9 kg (240 lb)  05/21/18 : 1 and wt change     - Nutrition Goals:      allow wt loss due to fluid losses, PO and supplement greater than 75% of needs and improved GI status      DIETITIAN FOLLOW UP: RD to follow up within 7 days    [address], [address] [place] [address] Dietitian [id number]

## 2018-11-02 NOTE — PROGRESS NOTES
[facility] Hospitalist Progress Note                                                                   [address] [facility], [address]  7/11/1950    CC: f/u     SUBJECTIVE:    Overnight went into A fib with R acetaminophen    Assessment/Plan:  Principal Problem:    Jaundice  Active Problems:    Small cell lung cancer (HCC)    Acute kidney injury ([facility] [address]    Hypokalemia    Azotemia    Liver metastases [facility])      Patient is a 79year old female with PMH sig for rece

## 2018-11-02 NOTE — CONSULTS
Reason for Consultation: Atrial fibrillation    Assessment/Plan:     1. Small cell lung cancer  - liver metastasis  2.  Atrial fibrillation vs ST with PAT      PLAN:  - check Echo  - add diltiazem      HPI:     Raffy Bowles is a 79year old female w Quit date: 3/17/1990        Years since quittin.6      Smokeless tobacco: Never Used    Alcohol use:  Yes      Alcohol/week: 0.5 oz      Types: 1 Glasses of wine per week      Comment: socially    Drug use: No       Medications:    Current Facility-Adm 144/80 97.9 °F (36.6 °C) Oral 95 18 94 % 5' 3\" (1.6 m) 234 lb 8 oz (106.4 kg)       Intake/Output Summary (Last 24 hours) at 11/2/2018 0916  Last data filed at 11/2/2018 0001  Gross per 24 hour   Intake 1600 ml   Output 350 ml   Net 1250 ml       Hemodyna hilum and mediastinum. 5. Blunted right costophrenic angle. 6. Elevated right hemidiaphragm. 7. Azygos fissure. 8. Catheter in superior vena cava. 9. A preliminary report was submitted and there are no major discrepancies. Dictated by (CST):  Norma Headley,

## 2018-11-02 NOTE — CONSULTS
Hematology/Oncology Consult Note        NAME: Kadi Wharton - ROOM: 450/[facility] - MRN: [id number] - Age: 79year old - : 1950    Reason for Consult:  Extensive staged small cell carcinoma    Soraya is well known to me for new diagnosis of sma Packs/day: 0.50        Years: 15.00        Pack years: 7.5        Quit date: 3/17/1990        Years since quittin.6      Smokeless tobacco: Never Used    Alcohol use:  Yes      Alcohol/week: 0.5 oz      Types: 1 Glasses of wine per week      Comment: 11/02/18   0746   RBC  3.56*   HGB  12.1   HCT  34.8*   MCV  97.9   MCH  33.8*   MCHC  34.6   RDW  16.2*   WBC  10.8   PLT  231     Recent Labs   Lab  11/01/18   1102  11/02/18   0053  11/02/18   0746   GLU  144*   --   109*   BUN  41*   --   30*   CREATSE

## 2018-11-02 NOTE — ED PROVIDER NOTES
Patient Seen in: [facility] 4w/sw/se    History   Patient presents with:  Abnormal Result (metabolic, cardiac)  Dizziness (neurologic)    Stated Complaint: Sent by Dr. Elisabet Smith for levated liver enzymes     HPI    Patient complains of weakness, tablet by mouth daily.        Family History   Problem Relation Age of Onset   • Ovarian Cancer Maternal Grandmother    • Breast Cancer Paternal Aunt    • Diabetes Father    • Heart Disorder Mother        Social History    Tobacco Use      Smoking status: F Abnormal; Notable for the following components:       Result Value    Ammonia 117.0 (*)     All other components within normal limits   POTASSIUM - Abnormal; Notable for the following components:    Potassium 2.6 (*)     All other components within normal Procedures:      Critical Care:      Spoke with hospitalist and heme onc. Admit initiate chemo.   MDM     Admission disposition: 11/1/2018  4:56 PM                 Disposition and Plan     Clinical Impression:  Jaundice  (primary encounter diagnosis)

## 2018-11-03 NOTE — PLAN OF CARE
Patient Centered Care    • Patient preferences are identified and integrated in the patient's plan of care Progressing        Patient/Family Goals    • Patient/Family Long Term Goal Progressing    • Patient/Family Short Term Goal Progressing        SKIN/TI

## 2018-11-03 NOTE — PROGRESS NOTES
Pulmonary Progress Note        NAME: Jamal Mcgovern - ROOM: 450/[facility]-A - MRN: [id number] - Age: 79year old - : 1950    Past Medical History:   Diagnosis Date   • Cancer ([facility] [address]     Lung Ca   • Disorder of thyroid    • Diverticulitis    • Hypoth Labs   Lab  11/01/18   1102  11/02/18   0053  11/02/18   0746  11/03/18   0611   GLU  144*   --   109*  148*   BUN  41*   --   30*  33*   CREATSERUM  1.32*   --   0.79  0.68   GFRAA   --    --   >60  >60   GFRNAA   --    --   >60  >60   CA   --    --   8.0

## 2018-11-03 NOTE — PROGRESS NOTES
[facility] Hospitalist Progress Note                                                                   [address] [facility], [address]  7/11/1950    CC: f/u     SUBJECTIVE:  Pt seen in AM, denies CP.  States t chloride 83 mL/hr at 11/03/18 1150     PRN: Normal Saline Flush, PEG 3350, magnesium hydroxide, bisacodyl, ondansetron HCl, Metoclopramide HCl, morphINE sulfate **OR** morphINE sulfate **OR** morphINE sulfate, acetaminophen    Assessment/Plan:  Principal P stools     Hypokalemia- acute  -replete, likely due to lasix     Hypothyroidism  -cont home med     Peripheral edema  -hold lasix for now as receiving mIVF while on chemo     Hypoxia  -Will obtain repeat CXR  -Wean O2 as tolerated  -On mIVF as she is under

## 2018-11-03 NOTE — CONSULTS
[facility]    Report of Consultation    Kirwin Lyman Patient Status:  Inpatient    1950 MRN [id number]   Location [facility] 4W/SW/SE Attending Anibal Villagomez MD   Hosp Day # 2 PCP Nancee Schwab, MD     Date of Performed by Machelle Harrell MD at [address] [facility]   • ADILSON NEEDLE LOCALIZATION W/ SPECIMEN 1 SITE LEFT      benign   • ADILSON NEEDLE LOCALIZATION W/ SPECIMEN 1 SITE RIGHT      benign   • OTHER      lower right jaw oral surgery   • OTHER      cyst right wrist   • T injection 10 mg, 10 mg, Intravenous, Q8H PRN  •  morphINE sulfate (PF) 2 MG/ML injection 1 mg, 1 mg, Intravenous, Q2H PRN **OR** morphINE sulfate (PF) 2 MG/ML injection 2 mg, 2 mg, Intravenous, Q2H PRN **OR** morphINE sulfate (PF) 4 MG/ML injection 4 mg, 4 11/03/2018    CO2 24 11/03/2018     (H) 11/03/2018    CA 8.1 (L) 11/03/2018    ALB 1.8 (L) 11/03/2018    ALKPHO 390 (H) 11/03/2018    BILT 8.6 (H) 11/03/2018    TP 5.4 (L) 11/03/2018    AST 94 (H) 11/03/2018     (H) 11/03/2018    INR 1.0 10/2 (cpt=71045)    Result Date: 11/3/2018  CONCLUSION: 1.  Right basilar consolidation/atelectasis perhaps slightly improved but the overall appearance of the chest is worse when compared to November 1, 2018 with considerable increase in markings bilaterally donohue to be contained, prob few days old since small air fluid level.   Small amt of susan surrounding perf site  I spoke with IR, they feel abscess in difficult place to drain  I spoke with Dr Carlton Liang, oncology, hold chemo therapy, will treat medically for present

## 2018-11-03 NOTE — PHYSICAL THERAPY NOTE
Chart reviewed   Attempt for therapy this AM    Pt declined participation      Pt reports needs to drink her contrast and has been having diarrhea/incontinent stool and does not want to get up right now.    Pt education for ankle pumps     Therapy will foll

## 2018-11-03 NOTE — PROGRESS NOTES
Reason for Visit: Atrial fibrillation    Assessment/Plan:     1. Small cell lung cancer  - liver metastasis  2.  Atrial fibrillation vs ST with PAT      PLAN:    -  Echo EF 60%   - add diltiazem  - Tele   - reviewed w/ wife at bedside   - follow K       HPI Packs/day: 0.50        Years: 15.00        Pack years: 7.5        Quit date: 3/17/1990        Years since quittin.6      Smokeless tobacco: Never Used    Alcohol use:  Yes      Alcohol/week: 0.5 oz      Types: 1 Glasses of wine per week      Comment: 234 lb (106.1 kg)   11/02/18 0624 — — — 90 — — — —   11/01/18 2015 126/84 — — 118 20 95 % — —   11/01/18 1857 123/64 — — 107 18 94 % — —   11/01/18 1715 (!) 146/97 — — 108 18 94 % — —   11/01/18 1529 144/80 97.9 °F (36.6 °C) Oral 95 18 94 % 5' 3\" (1.6 m) 11/2/2018  CONCLUSION:  1. Unfavorable change from October 8, 2018 with right middle and right lower lobe consolidation/atelectasis suggesting occlusion of bronchus intermedius. 2. Borderline cardiomegaly. 3. Atherosclerosis.  4. Prominent hilum and mediast

## 2018-11-03 NOTE — PHYSICAL THERAPY NOTE
PHYSICAL THERAPY EVALUATION - INPATIENT     Room Number: 450/450-A  Evaluation Date: 11/3/2018  Type of Evaluation: Initial   Physician Order: PT Eval and Treat    Presenting Problem: Jaundice   SHERRILL   Lung CA with liver mets   new onset AF this admission assistance for bed mobility tasks and required therapy direction and encouragement to participate with task. Pt able to support self at EOB , initially fearful of falling . Therapy provided encouragement , pt able to support self at EOB with CGA .     Pt and family comfort level in caring for pt at home. At this time pt does require 24 hr assisted skilled care , therapy does recommend continued skilled therapy to maximize pt fxn status. SW to work with family and pt in optimal dc planning.   Therapy di with progression of right sided changes, pt denies does not complain of SOB, fevers, productive cough.  Appreciate pulm consultant, do not feel this is PNA  -PCT is elevated, however patient afebrile and there is no      SHERRILL - resolved  -creat 1.3 from base LOCALIZATION W/ SPECIMEN 1 SITE LEFT      benign   • ADILSON NEEDLE LOCALIZATION W/ SPECIMEN 1 SITE RIGHT      benign   • OTHER      lower right jaw oral surgery   • OTHER      cyst right wrist   • TOTAL KNEE REPLACEMENT Right 06/26/2017    [facility] SI reports feeling like LE will buckle with fxn mobility   Pt did demonstrate full ankle pumps    Pt with generalized loss of LE str noted as did require assisted mobiltiy and poor activity tolerance   ----    BALANCE  Static Sitting: Fair +  Dynamic Sitting: session/findings; All patient questions and concerns addressed; Alarm set(talked with family outside room and to return to room )    CURRENT GOALS    Goals to be met by: 2 weeks   Patient Goal Patient's self-stated goal is:none   Family goal for MARIA LUZ     Goal

## 2018-11-03 NOTE — RESPIRATORY THERAPY NOTE
FLORENCE ASSESSMENT:    Pt does not have a previous diagnosis of FLORENCE. Pt does not routinely use a CPAP device at home. This pt is suspected to be at high risk for FLORENCE and sleep lab packet was provided to patient for outpatient follow-up.consider sleep study.

## 2018-11-03 NOTE — PROGRESS NOTES
[facility]  Progress Note    Urban Daley Patient Status:  Inpatient    1950 MRN [id number]   Location [facility] 4W/SW/SE Attending Adonis Navarro MD   Hosp Day # 2 PCP Flor Ma MD     Subjective:     Tolerating chem cell lung cancer  On chemotherapy with Carboplatin and Etoposide  So far has no major toxicity from the chemotherapy  Plan complete Day 3 of chemo and then DC tomorrow   Labs so far are stable    We will follow       Taylor Sullivan MD

## 2018-11-03 NOTE — PLAN OF CARE
Date: 11/2/18  Drug name and dosage administered: etoposide 210mg    Drug Sequence:     Location of intravenous site: left chest port    Needle size, type, location: 19g 1.5 inch CT-compatible olvera needle    Type of pump (if infusion): alaris    Verificat

## 2018-11-04 NOTE — PLAN OF CARE
GASTROINTESTINAL - ADULT    • Minimal or absence of nausea and vomiting Progressing    • Maintains or returns to baseline bowel function Progressing        MUSCULOSKELETAL - ADULT    • Return mobility to safest level of function Progressing    • Maintain p

## 2018-11-04 NOTE — PROGRESS NOTES
[facility] Hospitalist Progress Note                                                                   [address] [facility], [address]  7/11/1950    CC: f/u     SUBJECTIVE:  Pt seen in AM, wife at bedside.  Pt Intravenous Q8H   • Heparin Sodium (Porcine)  5,000 Units Subcutaneous Q8H [address]   • DilTIAZem HCl ER Coated Beads  120 mg Oral Daily   • ondansetron (ZOFRAN) IVPB  8 mg Intravenous Once per day on Fri Sat   • etoposide (VEPESID, -16) chemo infusion  100 m days per general surgery     Atrial fibrillation   -Currently sinus  -Cardiology following, appreciate  -TTE with preserved LVEF,   -diltiazem     Leukocytosis- intermittent  -likely 2/2 to intra-abdominal infection discussed above  -CXR with progression o

## 2018-11-04 NOTE — PROGRESS NOTES
[facility]    Progress Note    Coral Friendly Patient Status:  Inpatient    1950 MRN [id number]   Location [facility] 4W/SW/SE Attending Leyla Townsend MD   Hosp Day # 3 PCP Casimiro Horowitz MD            Subjective: 131*  94*  86*   ALT  228*  190*  154*   BILT  9.4*  8.6*  5.8*   TP  5.6*  5.4*  5.2*             Ct Abdomen+pelvis(contrast Only)(cpt=74177)    Result Date: 11/3/2018  CONCLUSION:  1.   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 2. Borderline cardiomegaly. 3. Atherosclerosis. 4. Prominent hilum and mediastinum. 5. Catheter in superior vena cava. Dictated by (CST): Kathryn Ford MD on 11/03/2018 at 13:44     Approved by (CST):  Kathryn Ford MD

## 2018-11-04 NOTE — PROGRESS NOTES
Reason for Visit: Atrial fibrillation    Assessment/Plan:     1. Small cell lung cancer  - liver metastasis  2. Atrial fibrillation vs ST with PAT  3.  Perforated bowel  Med  Rx       PLAN:    -  Echo EF 60%   - add diltiazem gentle Rx as asymptomatic and b of Premature CAD: No   Social History:  Social History    Tobacco Use      Smoking status: Former Smoker        Packs/day: 0.50        Years: 15.00        Pack years: 7.5        Quit date: 3/17/1990        Years since quittin.6      Smokeless tobacco: morphINE sulfate (PF) 4 MG/ML injection 4 mg 4 mg Intravenous Q2H PRN   acetaminophen (TYLENOL) tab 650 mg 650 mg Oral Q6H PRN   DilTIAZem HCl (CARDIZEM) injection 5 mg 5 mg Intravenous Once       Allergies:    Amoxicillin             HIVES  Penicillins 24  26     No results for input(s): TROP, CK in the last 168 hours.   Recent Labs   Lab  11/04/18   0547   RBC  3.15*   HGB  10.6*   HCT  30.9*   MCV  98.2   MCH  33.8*   MCHC  34.4   RDW  16.9*   WBC  9.3   PLT  187         Imaging:  Ct Abdomen+pelvis(cont slightly improved but the overall appearance of the chest is worse when compared to November 1, 2018 with considerable increase in markings bilaterally suggesting CHF. 2. Borderline cardiomegaly. 3. Atherosclerosis. 4. Prominent hilum and mediastinum.  5. C

## 2018-11-04 NOTE — PROGRESS NOTES
[facility]  Progress Note    Jamal Mcgovern Patient Status:  Inpatient    1950 MRN [id number]   Location [facility] 4W/SW/SE Attending Crissy Estrada MD   Hosp Day # 3 PCP Hannah Dan MD     Subjective:    CT scan reviewe technique for dose reduction was used. FINDINGS:          LIVER:  The liver has a lobulated contour likely due to the presence of multiple hypoenhancing mass lesions throughout the left and right lobes. Liver is enlarged at 20.3 cm in length.   Over wall edema along the lateral side of the abdomen and pelvis. No organized measurable fluid collection. No abdominal wall hernia. URINARY BLADDER:    No visible focal wall thickening, lesion or calculus.     PELVIC NODES:            No enlarged mass or ad represent an abscess, and an additional cluster of free air is present in the ventral lower retroperitoneum proximal/cephalad to this collection. This is new since prior outside institution CT from 10/15/18. 3.  Post cholecystectomy.   No biliary ductal d be in three weeks     2. Bowel perforation   Likely related to diverticulitis   On observation per surgery       I had reviewed case with Dr Joe Aguirre.  We will follow       Cadence Garcia MD

## 2018-11-04 NOTE — CM/SW NOTE
11/4CM-PT informed this Writer that she is recommending SNF and the Patient's and her family like to see Social Work. This Writer met with the Patient and her wife Ibrahima Arreguin ([phone]) at bedside in regards to discharge planning.  The Patient and her wife

## 2018-11-04 NOTE — PLAN OF CARE
CARDIOVASCULAR - ADULT    • Absence of cardiac arrhythmias or at baseline Progressing        MUSCULOSKELETAL - ADULT    • Return mobility to safest level of function Progressing    • Maintain proper alignment of affected body part Progressing        PAIN -

## 2018-11-04 NOTE — PLAN OF CARE
CARDIOVASCULAR - ADULT    • Absence of cardiac arrhythmias or at baseline Progressing        GASTROINTESTINAL - ADULT    • Minimal or absence of nausea and vomiting Progressing    • Maintains or returns to baseline bowel function Progressing        MUSCULO

## 2018-11-05 NOTE — PROGRESS NOTES
[facility]    Progress Note    Angélica Fritz Patient Status:  Inpatient    1950 MRN [id number]   Location [facility] 4W/SW/SE Attending Nicole Rhoades MD   Hosp Day # 4 PCP Stevie Mckeon MD            Subjective: 137  138  140   K  3.4  3.6  4.1  4.1   CL  103  107  110   CO2  24  26  26   ALKPHO  390*  331*  331*   AST  94*  86*  75*   ALT  190*  154*  125*   BILT  8.6*  5.8*  4.8*   TP  5.4*  5.2*  4.9*             Ct Abdomen+pelvis(contrast Only)(cpt=74177)    R overall appearance of the chest is worse when compared to November 1, 2018 with considerable increase in markings bilaterally suggesting CHF. 2. Borderline cardiomegaly. 3. Atherosclerosis. 4. Prominent hilum and mediastinum.  5. Catheter in superior vena c

## 2018-11-05 NOTE — PROGRESS NOTES
[facility] Hospitalist Progress Note                                                                   [address], [address]  7/11/1950    CC: f/u     SUBJECTIVE:  Pt tolerating clear liquids this A Oral Daily   • allopurinol  100 mg Oral Daily   • levofloxacin  750 mg Intravenous Q24H   • metRONIDAZOLE  500 mg Intravenous Q8H   • Heparin Sodium (Porcine)  5,000 Units Subcutaneous Q8H [address]   • DilTIAZem HCl ER Coated Beads  120 mg Oral Daily   • ondans IV levofloxacin, IV flagyl (11/3- )  -diet advancement per surgery.  On clears  -Repeat CT A/P on 11/6/18 per general surgery     Atrial fibrillation   -Currently sinus  -Cardiology following, appreciate  -TTE with preserved LVEF,   -diltiazem     Leukocyto

## 2018-11-05 NOTE — PROGRESS NOTES
Reason for Visit: Atrial fibrillation    Assessment/Plan:     1. Small cell lung cancer  - liver metastasis  2. Atrial fibrillation vs ST with PAT  3.  Perforated bowel  Med  Rx       PLAN:    -  Echo EF 60%, will review  - metoprolol and diltiazem  - Tele Use      Smoking status: Former Smoker        Packs/day: 0.50        Years: 15.00        Pack years: 7.5        Quit date: 3/17/1990        Years since quittin.6      Smokeless tobacco: Never Used    Alcohol use:  Yes      Alcohol/week: 0.5 oz      Typ morphINE sulfate (PF) 4 MG/ML injection 4 mg 4 mg Intravenous Q2H PRN   acetaminophen (TYLENOL) tab 650 mg 650 mg Oral Q6H PRN   DilTIAZem HCl (CARDIZEM) injection 5 mg 5 mg Intravenous Once       Allergies:    Amoxicillin             HIVES  Penicillins for input(s): TROP, CK in the last 168 hours.   Recent Labs   Lab  11/04/18   0547   RBC  3.15*   HGB  10.6*   HCT  30.9*   MCV  98.2   MCH  33.8*   MCHC  34.4   RDW  16.9*   WBC  9.3   PLT  187         Imaging:  Ct Abdomen+pelvis(contrast Only)(cpt=74177) overall appearance of the chest is worse when compared to November 1, 2018 with considerable increase in markings bilaterally suggesting CHF. 2. Borderline cardiomegaly. 3. Atherosclerosis. 4. Prominent hilum and mediastinum.  5. Catheter in superior vena c

## 2018-11-05 NOTE — PROGRESS NOTES
[facility] AND [facility]  Progress Note    Miguel Patel Patient Status:  Inpatient    1950 MRN [id number]   Location [facility] 4W/SW/SE Attending Ashly Quiñones MD   Hosp Day # 4 PCP Mica Loera MD     Subjective:  Colonic perforati reconstruction technique for dose reduction was used. FINDINGS:          LIVER:  The liver has a lobulated contour likely due to the presence of multiple hypoenhancing mass lesions throughout the left and right lobes.   Liver is enlarged at 20.3 cm dependent body wall edema along the lateral side of the abdomen and pelvis. No organized measurable fluid collection. No abdominal wall hernia. URINARY BLADDER:    No visible focal wall thickening, lesion or calculus.     PELVIC NODES:            No enla clinical.  It may represent an abscess, and an additional cluster of free air is present in the ventral lower retroperitoneum proximal/cephalad to this collection. This is new since prior outside institution CT from 10/15/18. 3.  Post cholecystectomy. pleural effusions which may be reactive   however they could also represent metastatic sites.   There is also lymphadenopathy in the lower mediastinum at the level of the esophageal hiatus, within the aortic iliac chain of the retroperitoneum, and in the ri

## 2018-11-05 NOTE — OCCUPATIONAL THERAPY NOTE
OCCUPATIONAL THERAPY TREATMENT NOTE - INPATIENT        Room Number: 450/450-A                Problem List  Principal Problem:    Jaundice  Active Problems:    Small cell lung cancer ([facility] [address]    Acute kidney injury ([facility] [address]    Hypokalemia    Azotemia    Liver met position) while john paul care and LE dressing was completed with total A as pt required BLUE on RW to stand.  Pt completed these sit<>stands with cues for hand placement during transitional movement as well as instructions and cues to use body momentum to help assistance  Bed mobility: supine to sit Mod Ax 2  Sit<>stand: up to Max A x 2 from low surface    BALANCE ASSESSMENT  Static Sitting: sba  Dynamic Sitting: NT  Static Standing: Min A --requiring bilateral UE support on RW    FUNCTIONAL ADL ASSESSMENT  Groo

## 2018-11-05 NOTE — PAYOR COMM NOTE
--------------  CONTINUED STAY REVIEW    [facility] PPO  Subscriber #:  [id number]  Authorization Number: [id number]    Admit date: 11/1/18  Admit time: 2044    Admitting Physician: Chelita Barajas DO  Attending Physician:  Jada Love MD  Primary Care P 11/4/2018 1741 Given 25 mg Oral Calvin Dillon, RN      metRONIDAZOLE in NaCl (FLAGYL) 5 mg/ml IVPB premix 500 mg     Date Action Dose Route User    11/5/2018 0813 New Bag 500 mg Intravenous Octaviano Santana RN    11/4/2018 1655 New Bag 500 mg Oriana Mello Coated Beads  120 mg Oral Daily   • ondansetron (ZOFRAN) IVPB  8 mg Intravenous Once per day on Fri Sat   • etoposide (VEPESID, -16) chemo infusion  100 mg/m2 Intravenous Once per day on Thu Fri Sat   • Heparin Sodium (Porcine)  5,000 Units Subcutaneous resolved  -creat 1.3 from baseline 0.7  -poss hypovolemia from poor fluid intake and concurrent diuretic  -hydrate with NS IVF  -hold lasix for now     Hyperammonemia  -with assoc mild encephalopathy  -due to liver mets above  -lactulose scheduled, mentati Consultation:     Perforated colon     History of Present Illness:     Sebastian Clements is a a(n) 79year old female diagnosis of small cell carcinoma. She was undergoing an outpatient workup and due for pet scan.  She was seen in office for sick visit Exam:  General: afebrile, alert and oriented x 3, pleasant  HEENT: oropharynx ok  CV: Regular rate and rhythm  Lungs: CTA  Abdomen: soft, mild distension and moderate tenderness left  Extremity: no edema or cyanosis         Labs:         Lab Results   Comp colon with abscess  Pt with localized perforation of sigmoid colon, no evidence free perfoation, appears to be contained, prob few days old since small air fluid level.  Small amt of susan surrounding perf site  I spoke with IR, they feel abscess in difficul hypothyroidism, OA, and peripheral edema who presents sent by oncology for urgent chemotherapy.  CT A/P revealed likely contained perforation of sigmoid colon with abscess.      Benny, liver failure due to small cell lung ca with metastatic disease  -has NPO     Hypoxia  -CXR with some pulm edema  -Wean O2 as tolerated  -On gentle mIVF currently due to NPO status, will closely monitor pulmonary status while on mIVF.  Furthermore, she had received chemo and is at risk for TLS.      [facility]  -DNR, Dr. Miles quiros Oral, resp.  rate 20, height 1.6 m (5' 3\"), weight 109.9 kg (242 lb 6 oz), SpO2 96 %.     Physical Exam:  General: afebrile, alert and oriented x 3, pleasant  HEENT: oropharynx ok  CV: Regular rate and rhythm  Lungs: CTA  Abdomen: soft, mild distension and cyanosis  Skin: no rashes or lesions      Assessment/Plan:  Principal Problem:    Jaundice  Active Problems:    Small cell lung cancer (HCC)    Acute kidney injury ([facility] [address]    Hypokalemia    Azotemia    Liver metastases [facility])        Patient is a 79year old f IVF     Hyperammonemia  -with assoc mild encephalopathy  -due to liver mets above  -lactulose scheduled, mentation appears to have improved, will decrease to daily dosing as patient complaining of freq stools     Hypokalemia- acute  -replete, likely due to

## 2018-11-05 NOTE — PHYSICAL THERAPY NOTE
PHYSICAL THERAPY TREATMENT NOTE - INPATIENT     Room Number: 450/450-A       Presenting Problem: Jaundice   SHERRILL   Lung CA with liver mets   new onset AF this admission --pt with chemo treatments      Problem List  Principal Problem:    Jaundice  Active Pro shift requiring MOD A and verbal cues to correct. Bowel movement noted to leak from depends onto floor, towel placed for clean-up.  Pt ambulated 8' with rolling walker at MIN A with chair follow, demonstrated slow shuffled gait with increased effort; pt ret Static Sitting: Fair -  Dynamic Sitting: Fair -           Static Standing: Poor +  Dynamic Standing: Poor +    ACTIVITY TOLERANCE  Pre-activity, supine:  SPO2 95% on 3L  HR 78 bpm    Post-activity, seated:  SPO2 94% o be met by: 2 weeks; updated 11/5/18  Patient Goal Patient's self-stated goal is:none   Family goal for MARIA LUZ     Goal #1 Patient is able to demonstrate supine - sit EOB @ level: supervision/ CGA decrease cg burden       Goal #1   Current Status Pt performs b

## 2018-11-05 NOTE — CM/SW NOTE
Addend [id number]:     Pt's spouse, Redd Peoples inquired about palliative care following pt at [facility]. SW stated that palliative agency can be determined, pending SNF location.      **Community palliative order will be needed**  ----------------------------------------

## 2018-11-06 NOTE — PROGRESS NOTES
Assessment/Plan:     1. Small cell lung cancer  - liver metastasis  2. Atrial fibrillation vs ST with PAT  3.  Perforated bowel  Med  Rx       PLAN:    -  Echo EF 60%, will review  - metoprolol and diltiazem  - Tele       HPI:     Denies dyspnea or ches 15.00        Pack years: 7.5        Quit date: 3/17/1990        Years since quittin.6      Smokeless tobacco: Never Used    Alcohol use: Yes      Alcohol/week: 0.5 oz      Types: 1 Glasses of wine per week      Comment: socially    Drug use:  No HCl (CARDIZEM) injection 5 mg 5 mg Intravenous Once       Allergies:    Amoxicillin             HIVES  Penicillins             RASH      EXAM:     Patient Vitals for the past 24 hrs:   BP Temp Temp src Pulse Resp SpO2 Height Weight   11/02/18 0642 129/57 9 33.9*   MCHC  34.3   RDW  17.1*   WBC  1.5*   PLT  120*         Imaging:

## 2018-11-06 NOTE — PROGRESS NOTES
[facility] AND [facility]  Progress Note    Vesna Messina Patient Status:  Inpatient    1950 MRN [id number]   Location [facility] 4W/SW/SE Attending Arthur Koyanagi, MD   Hosp Day # 5 PCP Amaya Montenegro MD     Subjective:  Patient is very t dose reduction was used. FINDINGS:          LIVER:  The liver has a lobulated contour likely due to the presence of multiple hypoenhancing mass lesions throughout the left and right lobes. Liver is enlarged at 20.3 cm in length.   Over 20 lesions are the lateral side of the abdomen and pelvis. No organized measurable fluid collection. No abdominal wall hernia. URINARY BLADDER:    No visible focal wall thickening, lesion or calculus. PELVIC NODES:            No enlarged mass or adenopathy.      PEL and an additional cluster of free air is present in the ventral lower retroperitoneum proximal/cephalad to this collection. This is new since prior outside institution CT from 10/15/18. 3.  Post cholecystectomy. No biliary ductal dilatation.   4.  Small lower mediastinum at the level of the esophageal hiatus, within the aortic iliac chain of the retroperitoneum, and in the right portacaval chain. These nodes are   concerning for metastasis. Liver is replaced by multiple metastatic foci.   2.  There is co

## 2018-11-06 NOTE — PROGRESS NOTES
[facility] Hospitalist Progress Note     CC: Hospital Follow up    PCP: Casimiro Horowitz MD       Assessment/Plan:     Principal Problem:    Jaundice  Active Problems:    Small cell lung cancer ([facility] [address]    Acute kidney injury ([facility] [address]    Hypokalemia    Azotemia    Deidra intake and concurrent diuretic  -received IVF     Hyperammonemia  -with assoc mild encephalopathy  -due to liver mets above  - improved with lactulose, now with diarrhea  - will d/c lactulouse    Hypokalemia- acute  -replete, likely due to lasix     Hypoth EOMI, PERRLA, O2 NC in place  Neck: Supple, no JVD  Pulm: CTAB, no crackles or wheezes  CV: RRR, no murmurs   ABD: Soft, non-tender, non-distended, +BS  MSK: strength 5/5 in all extremities  Neuro: Grossly normal, CN intact, sensory intact  Psych: Affect- HCl, morphINE sulfate **OR** morphINE sulfate **OR** morphINE sulfate, acetaminophen

## 2018-11-07 PROBLEM — F41.9 ANXIETY: Status: ACTIVE | Noted: 2018-01-01

## 2018-11-07 PROBLEM — Z71.89 ADVANCE CARE PLANNING: Status: ACTIVE | Noted: 2018-01-01

## 2018-11-07 PROBLEM — Z71.89 GOALS OF CARE, COUNSELING/DISCUSSION: Status: ACTIVE | Noted: 2018-01-01

## 2018-11-07 NOTE — PHYSICAL THERAPY NOTE
PHYSICAL THERAPY TREATMENT NOTE - INPATIENT     Room Number: 450/450-A       Presenting Problem: Jaundice   SHERRILL   Lung CA with liver mets   new onset AF this admission --pt with chemo treatments      Problem List  Principal Problem:    Jaundice  Active Pro training;Strengthening    SUBJECTIVE  \"I can't get up today honey. It's been a bad day. \"    OBJECTIVE  Precautions: Bed/chair alarm    WEIGHT BEARING RESTRICTION  Weight Bearing Restriction: None    PAIN ASSESSMENT   Rating: (no c/o pain during session) slides bilaterally, x 20 ankle pumps     Position Supine       Patient End of Session: In bed;Needs met;Call light within reach;RN aware of session/findings; All patient questions and concerns addressed; Family present    CURRENT GOALS        Goals to be met

## 2018-11-07 NOTE — CONSULTS
[facility]    PATIENT'S NAME: Juvencio Nova   ATTENDING PHYSICIAN: Gabriela Zamarripa. Aubrie Sewell MD   CONSULTING PHYSICIAN: Alana Lopez.  Frances Winn MD   PATIENT ACCOUNT#:   [id number]    LOCATION:  [address] [place] [address] RECORD #:   [id number]       [facility]

## 2018-11-07 NOTE — CONSULTS
[facility] Patient Status:  Inpatient    1950 MRN [id number]   Location [facility] 4W/SW/SE Attending Shelbie Stiles MD   Hosp Day # 6 PCP Fatou Romero MD good surgical candidate. Past Medical History/Past Surgical History: History obtained from Epic.  In addition to the recently diagnosed small cell lung cancer with liver metastases on 10/22/2018, PMH/PSH is significant as shown below and also for obesit walking with a cane until about 1 week ago. There was 1 recent fall out of bed about 2 weeks ago. It was about 1 week ago that her daughter noted that Florencio Juarez was \"wasting away. \" She became so weak that she could barely get out of bed or get off the toil already a HCPOA form in [address] [place] [address] Rd. In that document, Soraya named wife Risa Ayers as her #1 HCPOA with her sister Karis Alexis as her successor agent. I also discussed that Tiesha Meek is already a DNR in [address] [place] [address] Rd.  I provided education about the importance of POLST co Intravenous, Once  •  melatonin cap/tab 5 mg, 5 mg, Oral, Nightly  •  metoprolol Tartrate (LOPRESSOR) tab 25 mg, 25 mg, Oral, 2x Daily(Beta Blocker)  •  filgrastim-sndz (ZARXIO) 480 MCG/0.8ML injection SOSY 480 mcg, 480 mcg, Subcutaneous, [email or web address]  •  al Coags:  Lab Results   Component Value Date    INR 1.0 10/22/2018       Chemistry:  Lab Results   Component Value Date    CREATSERUM 0.46 (L) 11/07/2018    BUN 16 11/07/2018     11/07/2018    K 3.8 11/07/2018    K 3.8 11/07/2018     11/07/ cephalad in the lower retroperitoneum. 3.  Colonic diverticulosis with mild colonic wall thickening in the mid-sigmoid colon suggesting mild or resolving diverticulitis.   Dictated by (CST): Jeffry Chambers MD on 11/07/2018 at 9:42     Approved by (CST): Ochoa patient or healthcare power of : Yes  Advance Directive: Copy on chart  Type of Healthcare Directive: Durable power of  for health care; Health care treatment directive           Pre-existing DNR/DNI Order: Yes, notify physician for order  D

## 2018-11-07 NOTE — PROGRESS NOTES
Assessment/Plan:     1. Small cell lung cancer  - liver metastasis  2. Atrial fibrillation vs ST with PAT  3.  Perforated bowel  Med  Rx       PLAN:    -  Echo EF 60%, will review  -  metoprolol and diltiazem  -  Tele       HPI:     Denies dyspnea or ch 15.00        Pack years: 7.5        Quit date: 3/17/1990        Years since quittin.6      Smokeless tobacco: Never Used    Alcohol use: Yes      Alcohol/week: 0.5 oz      Types: 1 Glasses of wine per week      Comment: socially    Drug use:  No injection 4 mg 4 mg Intravenous Q2H PRN   acetaminophen (TYLENOL) tab 650 mg 650 mg Oral Q6H PRN   DilTIAZem HCl (CARDIZEM) injection 5 mg 5 mg Intravenous Once       Allergies:    Amoxicillin             HIVES  Penicillins             RASH      EXAM: 11/07/18   1003   TROP  0.04*  0.04*     Recent Labs   Lab  11/07/18   0517   RBC  2.94*   HGB  9.9*   HCT  29.1*   MCV  98.8   MCH  33.8*   MCHC  34.2   RDW  16.9*   WBC  0.4*   PLT  76*         Imaging:  Ct Abdomen+pelvis(contrast Only)(cpt=74177)    Res increasing consolidation/atelectasis at the right base when compared to November 3, 2018. 2. Borderline cardiomegaly. 3. Atherosclerosis. 4. Prominent hilum and mediastinum. 5. Catheter in superior vena cava.  6. A preliminary report was submitted and there

## 2018-11-07 NOTE — PROGRESS NOTES
[facility] Hospitalist Progress Note     CC: Hospital Follow up    PCP: Leon Reddy MD       Assessment/Plan:     Principal Problem:    Jaundice  Active Problems:    Small cell lung cancer ([facility] [address]    Acute kidney injury ([facility] [address]    Hypokalemia    Azotemia    Deidra lasix 20 mg on 11/5/18  - troponin mildly elevated 0.04, repeat stable.  EKG without ischemic changes   - likely due to fluid overload  - will give another dose of IV lasix 40 mg x 1 today     Atrial fibrillation   -Currently sinus  -Cardiology following, a Intake 558 ml   Output —   Net 558 ml       Last 3 Weights  11/05/18 0554 : 242 lb 6 oz (109.9 kg)  11/04/18 0549 : 237 lb 4.8 oz (107.6 kg)  11/03/18 0632 : 241 lb (109.3 kg)  11/02/18 0642 : 234 lb (106.1 kg)  11/01/18 1529 : 234 lb 8 oz (106.4 kg)  11 partially included in the field of imaging however this is compatible with known small cell lung cancer of the right pulmonary hilum. There is additional lymphadenopathy in the lower posterior mediastinum which is inseparable from the distal esophagus.   Brandon Astorga 8:37              Meds:     • sodium chloride       • melatonin  5 mg Oral Nightly   • metoprolol Tartrate  25 mg Oral 2x Daily(Beta Blocker)   • filgrastim-sndz  480 mcg Subcutaneous [email or web address]   • allopurinol  100 mg Oral Daily   • levofloxacin  750 mg I

## 2018-11-07 NOTE — PROGRESS NOTES
[facility]  Progress Note    Cole Michel Patient Status:  Inpatient    1950 MRN [id number]   Location [facility] 4W/SW/SE Attending Anibal Villagomez MD   Hosp Day # 6 PCP Nancee Schwab, MD     Subjective:  Feels okay this a used.       FINDINGS:          LIVER:  The liver has a lobulated contour likely due to the presence of multiple hypoenhancing mass lesions throughout the left and right lobes. Liver is enlarged at 20.3 cm in length.   Over 20 lesions are present within the of the abdomen and pelvis. No organized measurable fluid collection. No abdominal wall hernia. URINARY BLADDER:    No visible focal wall thickening, lesion or calculus. PELVIC NODES:            No enlarged mass or adenopathy.      PELVIC ORGANS: cluster of free air is present in the ventral lower retroperitoneum proximal/cephalad to this collection. This is new since prior outside institution CT from 10/15/18. 3.  Post cholecystectomy. No biliary ductal dilatation.   4.  Small bilateral pleural however they could also represent metastatic sites. There is also lymphadenopathy in the lower mediastinum at the level of the esophageal hiatus, within the aortic iliac chain of the retroperitoneum, and in the right portacaval chain.   These nodes are follow along closely. James Lawrence MD      Addendum: case discussed with Dr. Harriet Treviño, CT with abscess given her decline, she is not an operable candidate. Recommend conservative management for now.

## 2018-11-07 NOTE — PROGRESS NOTES
[facility]    Progress Note    Annie Sánchez Patient Status:  Inpatient    1950 MRN [id number]   Location [facility] 4W/SW/SE Attending Lacy Hernandez MD   Hosp Day # 6 PCP Joya Malik MD            Subjective: 1. 6*  1.6*   NA  140  139  137   K  4.1  4.1  3.7  3.8  3.8   CL  110  106  105   CO2  26  26  26   ALKPHO  331*  351*  375*   AST  75*  74*  76*   ALT  125*  112*  108*   BILT  4.8*  4.2*  4.1*   TP  4.9*  4.9*  5.2*             Ct Abdomen+pelvis(contrast 11/7/2018  CONCLUSION: 1. increasing consolidation/atelectasis at the right base when compared to November 3, 2018. 2. Borderline cardiomegaly. 3. Atherosclerosis. 4. Prominent hilum and mediastinum. 5. Catheter in superior vena cava.  6. A preliminary repo

## 2018-11-07 NOTE — CM/SW NOTE
MD order received regarding community palliative care. Referral has been made to [facility] for palliative care. At this time, rehab facility is to be determined. Referrals sent to both [facility] and [facility].       Diana Hernandez [place] ext [phone]

## 2018-11-07 NOTE — OCCUPATIONAL THERAPY NOTE
OCCUPATIONAL THERAPY TREATMENT NOTE - INPATIENT        Room Number: 450/450-A                Problem List  Principal Problem:    Jaundice  Active Problems:    Small cell lung cancer ([facility] [address]    Acute kidney injury ([facility] [address]    Hypokalemia    Azotemia    Liver met bed    OBJECTIVE  Precautions: Bed/chair alarm    WEIGHT BEARING RESTRICTION  Weight Bearing Restriction: None                PAIN ASSESSMENT  Ratin           ACTIVITY TOLERANCE  Tolerance is porr with pt reporting fatigue, demo anxiety.  Pt tolerates ~

## 2018-11-07 NOTE — PHYSICAL THERAPY NOTE
PHYSICAL THERAPY TREATMENT NOTE - INPATIENT     Room Number: 450/450-A       Presenting Problem: Jaundice   SHERRILL   Lung CA with liver mets   new onset AF this admission --pt with chemo treatments      Problem List  Principal Problem:    Jaundice  Active Pro with rolling walker at MAX A X 2 for bed pan use and clean-up, improving strength and form with practice and encouragement.  Pt able to stand ~1-2 min at a time with use of rolling walker at MIN A for standing balance, 2nd person assist for hygiene at [address] Moving from lying on back to sitting on the side of the bed?: A Lot   How much help from another person does the patient currently need. ..   -   Moving to and from a bed to a chair (including a wheelchair)?: A Lot   -   Need to walk in hospital room?: A Lo 11/5)   Goal #4 Assess stairs pending d/c plan  ---pt with 3 steps into home   Set a goal    Goal #4   Current Status Not initiated; not appropriate at this time   Goal #5 Patient to demonstrate independence with home activity/exercise instructions provide

## 2018-11-07 NOTE — PAYOR COMM NOTE
--------------  CONTINUED STAY REVIEW    Manuel Najjar PP  Subscriber #:  [id number]  Authorization Number: [id number]    Admit date: 11/1/18  Admit time: 2044    Admitting Physician: Yair Arenas DO  Attending Physician:  Alfreda Bush MD  Primary Car premix 500 mg     Date Action Dose Route User    11/7/2018 0620 New Bag 500 mg Intravenous Lacy Free, RN    11/6/2018 2305 New Bag 500 mg Intravenous Lacy Free, RN    11/6/2018 1620 New Bag 500 mg Intravenous Kwabena Lozada RN      Normal S DNR/DNI  -Continue supportive medical treatments  -Pt is NOT ready for transition to comfort care/hospice; plans to continue pursuing cancer treatment  -Pt is hopeful for discharge to SNF for [facility] stay; SW asked to make referral for community palliative car [id number]  11/06/18   0455  11/07/18   0517   GLU  88  98  165*   BUN  23*  19  16   CREATSERUM  0.50  0.38*  0.46*   GFRAA  >60  >60  >60   GFRNAA  >60  >60  >60   CA  7.9*  8.0*  7.9*   ALB  1.6*  1.6*  1.6*   NA  140  139  137   K  4.1  4.1  3.7  3.8  3.8 mild or resolving diverticulitis.   Dictated by (CST): Kathie Young MD on 11/07/2018 at 9:42     Approved by (CST): Kathie Young MD on 11/07/2018 at 9:57           Xr Chest Ap Portable  (cpt=71045)     Result Date: 11/7/2018  CONCLUSION: 1. increasing conso sent to both [facility] and [facility]    11/7 HOSPITALIST PROGRESS NOTE   Subjective:      Felt short of breath last night. Denies chest pain/shortness of breath now. CT today redemonstrates bowel perf with contrast in leak.  Patient not ready for ho O2 as tolerated  -Now that diet advanced to clears, stop mIVF. (had received gentle mIVF due to NPO status and prior to that had been on mIVF due to risk of TLS while on chemo)  -received one dose of IV lasix 20 mg on 11/5/18  - troponin mildly elevated 0.

## 2018-11-08 NOTE — CONSULTS
[facility]  Palliative Care Follow Up    Angélica Fritz Patient Status:  Inpatient    1950 MRN [id number]   Location [facility] 4W/SW/SE Attending Tonie Hung MD   Hosp Day # 7 PCP Stevie Mckeon MD     Date of (overweight)  : deferred  General color: pale/jaundiced  Neurologic: level of consciousness-alert  Orientation: x 4  Appearance: appropriately groomed  Affect: normal  Judgment: normal    Allergies:    Amoxicillin             HIVES  Penicillins **OR** morphINE sulfate (PF) 4 MG/ML injection 4 mg, 4 mg, Intravenous, Q2H PRN  •  acetaminophen (TYLENOL) tab 650 mg, 650 mg, Oral, Q6H PRN  •  DilTIAZem HCl (CARDIZEM) injection 5 mg, 5 mg, Intravenous, Once    No current outpatient medications on file. quadrant. The most caudal aspect of this finding (in the left lower quadrant) demonstrates a thin peripheral wall and also contains a small volume of bowel contrast material, and this finding is close to the mid-sigmoid colon.   Findings suggests there has tubes    Palliative Care Assessment/Plan:    Jaundice       Small cell lung cancer (HCC)       Acute kidney injury ([address]       Hypokalemia       Azotemia       Liver metastases (HCC)       Anxiety  -Continue Xanax 0.25 mg QID PRN anxiety        Goals of ca

## 2018-11-08 NOTE — PROGRESS NOTES
Assessment/Plan:     1. Small cell lung cancer  - liver metastasis  2. Atrial fibrillation vs ST with PAT  3.  Perforated bowel  Med  Rx       PLAN:    -  Echo EF 60%, will review  -  metoprolol and diltiazem  -  Tele       HPI:     Denies dyspnea or ch Pack years: 7.5        Quit date: 3/17/1990        Years since quittin.6      Smokeless tobacco: Never Used    Alcohol use:  Yes      Alcohol/week: 0.5 oz      Types: 1 Glasses of wine per week      Comment: socially    Drug use: No       Medications: Oral Q6H PRN   DilTIAZem HCl (CARDIZEM) injection 5 mg 5 mg Intravenous Once       Allergies:    Amoxicillin             HIVES  Penicillins             RASH      EXAM:     Patient Vitals for the past 24 hrs:   BP Temp Temp src Pulse Resp SpO2 Height Weight HGB  9.0*   HCT  26.1*   MCV  99.0   MCH  34.0*   MCHC  34.3   RDW  16.7*   WBC  0.4*   PLT  41*         Imaging:  Ct Abdomen+pelvis(contrast Only)(cpt=74177)    Result Date: 11/7/2018  CONCLUSION:  1. There is history of hepatic metastasis.   There is l 2. Borderline cardiomegaly. 3. Atherosclerosis. 4. Prominent hilum and mediastinum. 5. Catheter in superior vena cava. 6. A preliminary report was submitted and there is agreement without major discrepancies. Dictated by (CST):  Marcell Hardin MD on 1

## 2018-11-08 NOTE — PROGRESS NOTES
[facility] AND [facility]  Progress Note    [facility] Patient Status:  Inpatient    1950 MRN [id number]   Location [facility] 4W/SW/SE Attending Jada Love MD   Hosp Day # 7 PCP Laverne Gill MD     Subjective:  Feels okay, Berny Jarrell liver has a lobulated contour likely due to the presence of multiple hypoenhancing mass lesions throughout the left and right lobes. Liver is enlarged at 20.3 cm in length. Over 20 lesions are present within the liver.   Index focus adjacent   to the hepa measurable fluid collection. No abdominal wall hernia. URINARY BLADDER:    No visible focal wall thickening, lesion or calculus. PELVIC NODES:            No enlarged mass or adenopathy. PELVIC ORGANS:         No visible mass.   Pelvic organs approp ventral lower retroperitoneum proximal/cephalad to this collection. This is new since prior outside institution CT from 10/15/18. 3.  Post cholecystectomy. No biliary ductal dilatation. 4.  Small bilateral pleural effusions.   Probable secondary aurelia esophageal hiatus, within the aortic iliac chain of the retroperitoneum, and in the right portacaval chain. These nodes are   concerning for metastasis. Liver is replaced by multiple metastatic foci. 2.  There is colonic diverticulosis.   There is a gas for allowing me in the care of this patient. Will follow along closely.    Thu Richey MD

## 2018-11-08 NOTE — PROGRESS NOTES
[facility] Hospitalist Progress Note     CC: Hospital Follow up    PCP: Nancee Schwab, MD       Assessment/Plan:     Principal Problem:    Jaundice  Active Problems:    Small cell lung cancer ([facility] [address]    Acute kidney injury ([facility] [address]    Hypokalemia    Azotemia    Deidra mIVF due to risk of TLS while on chemo)  -received one dose of IV lasix 20 mg on 11/5/18  - troponin mildly elevated 0.04, repeat stable.  EKG without ischemic changes   - likely due to fluid overload  - will give another dose of IV lasix 40 mg x 1 11/7/18 123/48      Intake/Output:    Intake/Output Summary (Last 24 hours) at 11/8/2018 1735  Last data filed at 11/8/2018 1400  Gross per 24 hour   Intake 600 ml   Output 700 ml   Net -100 ml       Last 3 Weights  11/05/18 0554 : 242 lb 6 oz (109.9 kg)  11/04/18 in the visualized lower mediastinum and right hilum, which is only partially included in the field of imaging however this is compatible with known small cell lung cancer of the right pulmonary hilum.   There is additional lymphadenopathy in the lower poste 8:31     Approved by (CST):  Zeny Arriaga MD on 11/07/2018 at 8:37              Meds:     • melatonin  5 mg Oral Nightly   • metoprolol Tartrate  25 mg Oral 2x Daily(Beta Blocker)   • filgrastim-sndz  480 mcg Subcutaneous [email or web address]   • allopurinol  10

## 2018-11-08 NOTE — PHYSICAL THERAPY NOTE
PHYSICAL THERAPY TREATMENT NOTE - INPATIENT     Room Number: 450/450-A       Presenting Problem: Jaundice   SHERRILL   Lung CA with liver mets   new onset AF this admission --pt with chemo treatments      Problem List  Principal Problem:    Jaundice  Active Pro in bedside chair with BLE elevated on leg rest, all needs in place, RN aware. Pt progressing level of assist with transfers and ambulation distance this session.  Pt continues to demonstrate decreased activity tolerance, balance deficits, and generalized A Little   -   Climbing 3-5 steps with a railing?: Total     AM-PAC Score:  Raw Score: 13   PT Approx Degree of Impairment Score: 64.91%   Standardized Score (AM-PAC Scale): 36.74   CMS Modifier (G-Code): CL    FUNCTIONAL ABILITY STATUS  Gait Assessment

## 2018-11-08 NOTE — PLAN OF CARE
SKIN/TISSUE INTEGRITY - ADULT    • Skin integrity remains intact Not Progressing          CARDIOVASCULAR - ADULT    • Absence of cardiac arrhythmias or at baseline Progressing        GASTROINTESTINAL - ADULT    • Minimal or absence of nausea and vomiting P

## 2018-11-09 NOTE — OCCUPATIONAL THERAPY NOTE
OCCUPATIONAL THERAPY TREATMENT NOTE - INPATIENT        Room Number: 450/450-A                Problem List  Principal Problem:    Jaundice  Active Problems:    Small cell lung cancer ([facility] [address]    Acute kidney injury ([facility] [address]    Hypokalemia    Azotemia    Liver met TRANSFER ASSESSMENT  Supine to Sit : Not tested  Sit to Stand: Not tested  Toilet Transfer: NT- pt using bedpan  Shower Transfer: NT  Chair Transfer: NT    Bedroom Mobility: pt declined all OOB/EOB activity 2/ fatigue    BALANCE ASSESSMENT  Static Sitting:

## 2018-11-09 NOTE — PROGRESS NOTES
[address] [facility]    CDI Prediction Tool Protocol (Vancomycin Initiated)    OVP (oral vancomycin prophylaxis) 125 mg PO Daily is being started in this patient based on a score of 14.       Score Breakdown:  High risk antibiotic use (5 points)  Hospital

## 2018-11-09 NOTE — PROGRESS NOTES
[facility] Hospitalist Progress Note     CC: Hospital Follow up    PCP: Renata Rosario MD       Assessment/Plan:     Principal Problem:    Jaundice  Active Problems:    Small cell lung cancer ([facility] [address]    Acute kidney injury ([facility] [address]    Hypokalemia    Azotemia    Deidra lasix 20 mg on 11/5/18  - troponin mildly elevated 0.04, repeat stable.  EKG without ischemic changes   - likely due to fluid overload  - will give another dose of IV lasix 40 mg x 1 11/7/18    Atrial fibrillation   -Currently sinus  -Cardiology following, 4.8 oz (107.6 kg)  11/03/18 0632 : 241 lb (109.3 kg)  11/02/18 0642 : 234 lb (106.1 kg)  11/01/18 1529 : 234 lb 8 oz (106.4 kg)  11/01/18 1102 : 232 lb 4.8 oz (105.4 kg)  10/29/18 0839 : 230 lb (104.3 kg)  10/24/18 1603 : 234 lb (106.1 kg)      Exam   GEN: mediastinum which is inseparable from the distal esophagus. There is probable postobstructive atelectasis/pneumonia within the middle lobe and right lower lobe, and these changes appear to have progressed since 11/3/18 abdomen/pelvis CT.   There are small [email or web address]   • allopurinol  100 mg Oral Daily   • levofloxacin  750 mg Intravenous Q24H   • metRONIDAZOLE  500 mg Intravenous Q8H   • DilTIAZem HCl ER Coated Beads  120 mg Oral Daily   • ondansetron (ZOFRAN) IVPB  8 mg Intravenous Once per day

## 2018-11-09 NOTE — CONSULTS
Patient seen with her brother Marisol Davis and his wife Arvin Harrell at the bedside. Soraya was sound asleep and I did not awaken her per family's request. Manisha Lofton appears overall comfortable with no non-verbal signs of pain or dyspnea observed.     I introduced myself an

## 2018-11-09 NOTE — PROGRESS NOTES
Assessment/Plan:     1. Small cell lung cancer  - liver metastasis  2. Atrial fibrillation vs ST with PAT  3.  Perforated bowel  Med  Rx       PLAN:    -  Echo EF 60%, will review  -  metoprolol and diltiazem  -  Tele   -  intermittent lasix for edema 0.50        Years: 15.00        Pack years: 7.5        Quit date: 3/17/1990        Years since quittin.6      Smokeless tobacco: Never Used    Alcohol use:  Yes      Alcohol/week: 0.5 oz      Types: 1 Glasses of wine per week      Comment: socially DilTIAZem HCl (CARDIZEM) injection 5 mg 5 mg Intravenous Once       Allergies:    Amoxicillin             HIVES  Penicillins             RASH      EXAM:     Patient Vitals for the past 24 hrs:   BP Temp Temp src Pulse Resp SpO2 Height Weight   11/02/18 0 HCT  25.2*   MCV  99.1   MCH  33.8*   MCHC  34.1   RDW  16.5*   WBC  0.3*   PLT  24*         Imaging:  Ct Abdomen+pelvis(contrast Only)(cpt=74177)    Result Date: 11/7/2018  CONCLUSION:  1. There is history of hepatic metastasis.   There is lymphadenopat

## 2018-11-09 NOTE — PAYOR COMM NOTE
--------------  CONTINUED STAY REVIEW    [employer] PPO  Subscriber #:  [id number]  Authorization Number: [id number]    Admit date: 11/1/18  Admit time: 2044    Admitting Physician: Griffin Garcia DO  Attending Physician:  Jarred May MD  Primary Care Normal Saline Flush 0.9 % injection 3 mL     Date Action Dose Route User    11/9/2018 0951 Given 3 mL Intravenous Frederick Graham RN      Vancomycin HCl [facility]TE CARTY [facility]) 50 MG/ML oral solution 125 mg     Date Action Dose Route User    11/9/2018 0951 Given 125 mg -Continue IV levofloxacin, IV flagyl (11/3- )  -diet advancement per surgery.  On clears, improving over all  -Repeat CT A/P with same perforation, d/w surgery and oncology, not a surgical candidate     Shortness of breath/Hypoxia  -CXR with some pulm edema Blood pressure 132/54, pulse 87, temperature 98 °F (36.7 °C), temperature source Oral, resp.  rate 18, height 160 cm (5' 3\"), weight 232 lb (105.2 kg), SpO2 96 %.     Temp:  [97.7 °F (36.5 °C)-98 °F (36.7 °C)] 98 °F (36.7 °C)  Pulse:  [78-88] 87  Resp:  [1 5452  11/07/18   0517  11/08/18   0636   ALT  190*  154*  125*  112*  108*  92*   AST  94*  86*  75*  74*  76*  64*   ALB  1.8*  1.6*  1.6*  1.6*  1.6*  1.5*   LDH  664*   --    --    --    --    --             Imaging:  Ct Abdomen+pelvis(contrast Only)(c Result Date: 11/7/2018  CONCLUSION: 1. increasing consolidation/atelectasis at the right base when compared to November 3, 2018. 2. Borderline cardiomegaly. 3. Atherosclerosis. 4. Prominent hilum and mediastinum. 5. Catheter in superior vena cava.  6. A pre Pt seen up in bed and declined all OOB/EOB activity secondary to fatigue . Pt did participate in yellow theraband 2 x 10 in all planes with rest breaks, and participated in weighted dowel ex. 2 x 10 in all planes with rest breaks.  Pt tolerated therapy well Grooming: supervision with set up and long sitting in bed  Feeding: supervision with set up  Bathing: NT  Toileting: dep  Upper Extremity Dressing: NT  Lower Extremity Dressing: NT     Education Provided: role of OT, endurance ex.  To max strength as needed

## 2018-11-09 NOTE — DIETARY NOTE
ADULT NUTRITION REASSESSMENT     Pt is at high nutrition risk. Pt does not meet malnutrition criteria at this time.     RECOMMENDATIONS TO MD:  See Nutrition Intervention  Noted MD order for elemental diet--there is not an elemental po diet that is palat [E87.6]  Jaundice [R17]  Liver metastases ([facility] [address] [C78.7]  Small cell lung cancer ([facility] [address] [C34.90]  Acute kidney injury ([facility] [address] [N17.9]    Past Medical History   has a past medical history of Cancer [facility]), Disorder of thyroid, Diverticulitis, Hypothyroid, Muscle day on Fri Sat   • DilTIAZem HCl  5 mg Intravenous Once       LABS: reviewed  Recent Labs      11/07/18   0517  11/08/18   0636  11/09/18   0536   GLU  165*  89  121*   BUN  16  12  11   CREATSERUM  0.46*  0.36*  0.36*   CA  7.9*  7.6*  7.8*   MG  1.8  1.6

## 2018-11-09 NOTE — PROGRESS NOTES
[facility] CLIN[facility]  Progress Note    Crow Alex Patient Status:  Inpatient    1950 MRN [id number]   Location [facility] 4W/SW/SE Attending Zeyad Denis MD   Hosp Day # 8 PCP Cleopatra Hastings MD     Subjective:  Gabino Fernandez a throughout the left and right lobes. Liver is enlarged at 20.3 cm in length. Over 20 lesions are present within the liver. Index focus adjacent   to the hepatic IVC in the right lobe measures 40 mm in diameter.   Index focus in the left lobe  adjacent to thickening, lesion or calculus. PELVIC NODES:            No enlarged mass or adenopathy. PELVIC ORGANS:         No visible mass. Pelvic organs appropriate for patient age.     BONES:             Scattered mild degenerative endplate changes in the vi [facility] CT from 10/15/18. 3.  Post cholecystectomy. No biliary ductal dilatation. 4.  Small bilateral pleural effusions. Probable secondary compressive atelectasis in the lung bases however correlate for symptoms of pneumonia. 5.  Hepatomegaly. is replaced by multiple metastatic foci. 2.  There is colonic diverticulosis.   There is a gas and fluid collection which extends from the left side of the proximal sigmoid colon, suggesting it represents a site of contained perforation possibly related to

## 2018-11-10 NOTE — PROGRESS NOTES
Assessment/Plan:     1. Small cell lung cancer  - liver metastasis  2. Atrial fibrillation vs ST with PAT  3. Perforated bowel  Med  Rx   4.  Edema, worse today      PLAN:    -  Echo EF 60%  -  metoprolol and diltiazem  -  Tele   -  intermittent lasix f Former Smoker        Packs/day: 0.50        Years: 15.00        Pack years: 7.5        Quit date: 3/17/1990        Years since quittin.6      Smokeless tobacco: Never Used    Alcohol use:  Yes      Alcohol/week: 0.5 oz      Types: 1 Glasses of wine per RASH      EXAM:     Patient Vitals for the past 24 hrs:   BP Temp Temp src Pulse Resp SpO2 Height Weight   11/02/18 0642 129/57 98 °F (36.7 °C) Oral 80 18 94 % 5' 3\" (1.6 m) 234 lb (106.1 kg)   11/02/18 0624 — — — 90 — — — —   11/01/18 2015 126/84 — — 118 increasing consolidation/atelectasis at the right base when compared to November 3, 2018. 2. Borderline cardiomegaly. 3. Atherosclerosis. 4. Prominent hilum and mediastinum. 5. Catheter in superior vena cava.   6. A preliminary report was submitted and

## 2018-11-10 NOTE — PROGRESS NOTES
[facility] Hospitalist Progress Note     CC: Hospital Follow up    PCP: Cleopatra Hastings MD       Assessment/Plan:     Principal Problem:    Jaundice  Active Problems:    Small cell lung cancer ([facility] [address]    Acute kidney injury ([facility] [address]    Hypokalemia    Azotemia    Deidra risk of TLS while on chemo)  -received one dose of IV lasix 20 mg on 11/5/18  - troponin mildly elevated 0.04, repeat stable.  EKG without ischemic changes   - likely due to fluid overload  - will give another dose of IV lasix 40 mg x 1 11/10/18    Atrial f 242 lb 6 oz (109.9 kg)  11/04/18 0549 : 237 lb 4.8 oz (107.6 kg)  11/03/18 0632 : 241 lb (109.3 kg)  11/02/18 0642 : 234 lb (106.1 kg)  11/01/18 1529 : 234 lb 8 oz (106.4 kg)  11/01/18 1102 : 232 lb 4.8 oz (105.4 kg)  10/29/18 0839 : 230 lb (104.3 kg)  10/ ipratropium-albuterol, ALPRAZolam, Normal Saline Flush, PEG 3350, magnesium hydroxide, bisacodyl, ondansetron HCl, Metoclopramide HCl, morphINE sulfate **OR** morphINE sulfate **OR** morphINE sulfate, acetaminophen

## 2018-11-10 NOTE — PROGRESS NOTES
[facility]    Progress Note    Obey Villalba Patient Status:  Inpatient    1950 MRN [id number]   Location [facility] 4W/SW/SE Attending Raman Luis MD   Hosp Day # 9 PCP Kristi Arreola MD            Subjective: >60   CA  7.9*  7.6*  7.8*  7.8*   ALB  1.6*  1.5*   --   1.5*   NA  137  136  137  136   K  3.8  3.8  3.3  4.0  4.0  4.0   CL  105  101  103  104   CO2  26  29  29  29   ALKPHO  375*  397*   --   426*   AST  76*  64*   --   42*   ALT  108*  92*   --   64*

## 2018-11-10 NOTE — PROGRESS NOTES
[facility] AND CLINICS  Progress Note    Abhilashbetty Gilliland Patient Status:  Inpatient    1950 MRN [id number]   Location [facility] 4W/SW/SE Attending Theresa Gill MD   Hosp Day # 9 PCP Emili Mcarthur MD     Subjective:   Wife at bedside, extends into the left lower quadrant.   The most caudal aspect of this finding (in the left lower quadrant) demonstrates a thin peripheral wall and also contains a small volume of   bowel contrast material, and this finding is close to the mid-sigmoid colon

## 2018-11-11 NOTE — PROGRESS NOTES
[facility]  Cardiology Progress Note    Obey Villalba Patient Status:  Inpatient    1950 MRN [id number]   Location [facility] 4W/SW/SE Attending Caroline Mancuso MD   Hosp Day # 10 PCP Kristi Arreola MD     Assessment:  pafib Coated Beads  120 mg Oral Daily   • DilTIAZem HCl  5 mg Intravenous Once           Recent Labs      11/09/18   0536  11/10/18   0625  11/11/18   0539   WBC  0.3*  0.4*  0.2*   HGB  8.6*  8.2*  7.4*   PLT  24*  12*  6*     Recent Labs      11/09/18   0536

## 2018-11-11 NOTE — PROGRESS NOTES
[facility] Hospitalist Progress Note     CC: Hospital Follow up    PCP: Torie Witt MD       Assessment/Plan:     Principal Problem:    Jaundice  Active Problems:    Small cell lung cancer ([facility] [address]    Acute kidney injury ([facility] [address]    Hypokalemia    Azotemia    Deidra risk of TLS while on chemo)  -received one dose of IV lasix 20 mg on 11/5/18  - troponin mildly elevated 0.04, repeat stable.  EKG without ischemic changes   - likely due to fluid overload  - will give another dose of IV lasix 40 mg x 1 11/10/18 and 11/11/1 11/11/2018 1445  Last data filed at 11/11/2018 1346  Gross per 24 hour   Intake 526 ml   Output —   Net 526 ml       Last 3 Weights  11/10/18 1000 : 249 lb 11.2 oz (113.3 kg)  11/09/18 0530 : 232 lb (105.2 kg)  11/05/18 0554 : 242 lb 6 oz (109.9 kg)  11/04 Daily   • levofloxacin  750 mg Intravenous Q24H   • metRONIDAZOLE  500 mg Intravenous Q8H   • DilTIAZem HCl ER Coated Beads  120 mg Oral Daily   • DilTIAZem HCl  5 mg Intravenous Once       Normal Saline Flush, ipratropium-albuterol, ALPRAZolam, Normal Sal

## 2018-11-11 NOTE — PROGRESS NOTES
[facility] AND [facility]  Progress Note    Breana Ramos Patient Status:  Inpatient    1950 MRN [id number]   Location [facility] 4W/SW/SE Attending Nikita Rivers MD   Hosp Day # 10 PCP Bibiana Arias MD     Subjective:   Wife at bedside finding (in the left lower quadrant) demonstrates a thin peripheral wall and also contains a small volume of   bowel contrast material, and this finding is close to the mid-sigmoid colon.   Findings suggests there has been recent perforation of the mid-sigm

## 2018-11-12 NOTE — OCCUPATIONAL THERAPY NOTE
Second attempt made this PM for OT session. Attempt in conjunction with PT. ALEC Benitez approved activity. Upon arrival, pt supine in bed and sleeping. Lethargic, but easily awoken. Pt family present.  Pt declined activity at this time 2/2 lethargy and a busy

## 2018-11-12 NOTE — PROCEDURES
[facility] - [facility]  Procedure Note    Yasmine Borne Patient Status:  Inpatient    1950 MRN [id number]   Location [facility] 4W/SW/SE Attending Christiano Jose MD   Hosp Day # 6 PCP Stefan Jean MD     Procedure: port c

## 2018-11-12 NOTE — OCCUPATIONAL THERAPY NOTE
Attempt made for OT treatment this AM. Collaborated with PT who discussed with RN --please defer tx this AM. Pt with hemoglobin of 6.8 with planned transfusion this AM, pt to also be off the floor at IR this AM as well.  Will re-attempt this PM as pt is fernanda

## 2018-11-12 NOTE — PROGRESS NOTES
Assessment/Plan:     1. Small cell lung cancer  - liver metastasis  2. Atrial fibrillation vs ST with PAT  3. Perforated bowel  Med  Rx   4.  Edema, worse today      PLAN:    -  Echo EF 60%  -  metoprolol and diltiazem  -  intermittent lasix for edema; Former Smoker        Packs/day: 0.50        Years: 15.00        Pack years: 7.5        Quit date: 3/17/1990        Years since quittin.6      Smokeless tobacco: Never Used    Alcohol use:  Yes      Alcohol/week: 0.5 oz      Types: 1 Glasses of wine per morphINE sulfate (PF) 2 MG/ML injection 2 mg 2 mg Intravenous Q2H PRN   Or      morphINE sulfate (PF) 4 MG/ML injection 4 mg 4 mg Intravenous Q2H PRN   acetaminophen (TYLENOL) tab 650 mg 650 mg Oral Q6H PRN   DilTIAZem HCl (CARDIZEM) injection 5 mg 5 mg 3.5  3.8   CL  104  98  101   CO2  29  32  32     Recent Labs   Lab  11/07/18   0718  11/07/18   1003   TROP  0.04*  0.04*     Recent Labs   Lab  11/12/18   0440   RBC  2.02*   HGB  6.9*   HCT  19.9*   MCV  98.5   MCH  34.3*   MCHC  34.8   RDW  16.1*   WBC

## 2018-11-12 NOTE — PROGRESS NOTES
[facility] AND CLINICS  Progress Note    Kadi Wharton Patient Status:  Inpatient    1950 MRN [id number]   Location [facility] 4W/SW/SE Attending Bruce Ramirez MD   Hosp Day # 6 PCP Myrna Heredia MD     Subjective:   Wife at bedside finding (in the left lower quadrant) demonstrates a thin peripheral wall and also contains a small volume of   bowel contrast material, and this finding is close to the mid-sigmoid colon.   Findings suggests there has been recent perforation of the mid-sigm

## 2018-11-12 NOTE — CONSULTS
Patient seen with daughter Dileep Quiroz and wife/ZEKE Villavicencio at the bedside. Soraya is asleep and family asked me to not awaken her. According to Marina [place] had a few \"bad days\" over the weekend.  She apparently did not tolerate the port-a-cath plac

## 2018-11-12 NOTE — PHYSICAL THERAPY NOTE
Chart reviewed, discussed case with RN-pt recently received blood transfusion, lethargic from procedure, RN cleared to attempt therapy session. Pt received supine in bed, very lethargic and not opening eyes during discussion, refused therapy d/t fatigue.  P

## 2018-11-12 NOTE — PROGRESS NOTES
[facility] Hospitalist Progress Note     CC: Hospital Follow up    PCP: Jericho Patel MD       Assessment/Plan:     Principal Problem:    Jaundice  Active Problems:    Small cell lung cancer ([facility] [address]    Acute kidney injury ([facility] [address]    Hypokalemia    Azotemia    Deidra mIVF due to risk of TLS while on chemo)  -received one dose of IV lasix 20 mg on 11/5/18  - troponin mildly elevated 0.04, repeat stable.  EKG without ischemic changes   - likely due to fluid overload  - will give another dose of IV lasix 40 mg x 1 11/10/18 11/12/2018 0556  Gross per 24 hour   Intake 300 ml   Output 300 ml   Net 0 ml       Last 3 Weights  11/12/18 0556 : 243 lb 4.8 oz (110.4 kg)  11/10/18 1000 : 249 lb 11.2 oz (113.3 kg)  11/09/18 0530 : 232 lb (105.2 kg)  11/05/18 0554 : 242 lb 6 oz (109.9 k Omar Perry MD on 11/12/2018 at 15:59              Meds:     • Vancomycin HCl  125 mg Oral Daily   • melatonin  5 mg Oral Nightly   • metoprolol Tartrate  25 mg Oral 2x Daily(Beta Blocker)   • filgrastim-sndz  480 mcg [email or web address]   • [email or web address]

## 2018-11-12 NOTE — PHYSICAL THERAPY NOTE
Chart reviewed, pt Hg 6.9, discussed case with RN. Pt have blood transfusion this AM and IR procedure to check on port. To re-attempt pt this PM for PT session as appropriate, schedule permitting.

## 2018-11-13 PROBLEM — C34.90 NON-SMALL CELL LUNG CANCER WITH METASTASIS (HCC): Status: ACTIVE | Noted: 2018-01-01

## 2018-11-13 NOTE — CONSULTS
[facility]  Palliative Care Follow Up    Yasmine Borne Patient Status:  Inpatient    1950 MRN [id number]   Location [facility] 4W/SW/SE Attending Leah Acevedo MD   Hosp Day # 15 PCP Stefan Jean MD     Date o Review of Systems:  Pertinent items are noted in HPI. Objective:  Vital Signs:  Blood pressure 152/80, pulse 100, temperature 97.8 °F (36.6 °C), temperature source Oral, resp. rate 20, height 5' 3\" (1.6 m), weight 239 lb (108.4 kg), SpO2 90 %.   Bod metRONIDAZOLE in NaCl (FLAGYL) 5 mg/ml IVPB premix 500 mg, 500 mg, Intravenous, Q8H  •  DilTIAZem HCl ER Coated Beads (CARDIZEM CD) 24 hr cap 120 mg, 120 mg, Oral, Daily  •  Normal Saline Flush 0.9 % injection 3 mL, 3 mL, Intravenous, PRN  •  PEG 3350 (MI use.    Dictated by (CST): Lucina Morris MD on 11/12/2018 at 15:56     Approved by (CST): Lucina Morris MD on 11/12/2018 at 15:59              Palliative Performance Scale : 20%      Palliative Care Goals of Care:  Patient/Family: knows diagnosis/progn care.    Discussed today's visit with Dr. Cornelio Estrada and Mirella Ly and Adam Cortez[facility].. Will also discuss with RN Marilee Del Valle. Thank you for allowing the Palliative Care Team to participate in the care of your patient.  I will continue

## 2018-11-13 NOTE — H&P
[facility] Hospitalist H&P     CC: hospice     PCP: Flor Ma MD      Assessment and Plan     Patient is a 79year old female with PMH sig for recently diagnosed small cell lung ca with metastasis to the liver dx 10/22, obesity- BMI 41, hypothyroidism, comfort  -appreciate cardiac eval      Pancytopenia  -persistent, 2/2 chemo, did get both rbc and plts during admission  -on 11/13, counts stable, ANC up to 1.1 from 0.1.  -no further labs as noted above      Atrial fibrillation   -Currently sinus, no furt • INSERTION PORT-A-CATHETER N/A 10/29/2018    Performed by Janis Ang MD at [facility], [address] Right 6/26/2017    Performed by Arthea Schwab, MD at [facility] OR   • ADILSON NEEDLE LOCALIZATION W/ SPECIMEN 1 SITE LEFT --    --   1   --   31*       Recent Labs   Lab  11/09/18   0536  11/10/18   0625  11/11/18   0539  11/12/18   0440  11/13/18   0612   NA  137  136  135*  136  136   K  4.0  4.0  4.0  3.5  3.8  3.2*  3.2*   CL  103  104  98  101  99   CO2  29  29  32  32

## 2018-11-13 NOTE — PAYOR COMM NOTE
[facility] 11/10-11/13    CONTINUED STAY REVIEW    [facility] Service O  Subscriber #:  [id number]  Authorization Number: [id number]    Admit date: 11/1/18  Admit time: 2044    Admitting Physician: Griffin Garcia DO  Attending Physician:  Jarred May MD  Primary C Denise Zavala RN      Normal Saline Flush 0.9 % injection 10 mL     Date Action Dose Route User    11/12/2018 1540 Given 10 mL Intravenous Denise Zavala RN    11/12/2018 1301 Given 10 mL Intravenous Denise Zavala RN      Normal Saline Flu metastasis.  Liver bx confirmed SCC  -on admit: hyperbilirubinemia, elevated liver enzymes, hyperammonemia  -recent abd u/s with multiple liver metastasis  -follow CMP  -undergoing inpatient chemo per oncology, s/p day 2 of chemo on 11/3, holding further ch with diarrhea  - will d/c lactulouse     Hypokalemia- acute  -replete, likely due to lasix     Hypothyroidism  -cont home med     Peripheral edema  - getting lasix prn      GOC  -DNR     FEN:  - IVF:None  - Diet: clear  - Lytes:in am     DVT Prophy:hep sub colon with abscess  Pt without pain  VA stable, HR good  Pt with localized perforation of sigmoid colon, no evidence free perfoation, appears to be contained,  Repeat CT showed little change, however, small amt contrast in abscess area consistent with smal surgery okay to advance to full liquid diet   - await dietary consult for nutritional supplement     Afib  - EF 60%   - diltiazem per cardiology   - lasix for fluid retention   - cardiology following     11/10 CARDIOLOGY PROGRESS NOTE    Assessment/Plan:    Insomnia  - better sleep with melatonin/ ativan at night      Diarrhea  - secondary to lactulose     Elevated LFT's  - slowly improving      Bowel perforation   - abscess seen on recent imaging  - not a candidate for surgery     Afib  - EF 60%   - car candidate, cannot drain per IR, CPM, d/w surgery, agree with plan      Shortness of breath/Hypoxia  -CXR with some pulm edema  -Wean O2 as tolerated  -Now that diet advanced to clears, stop mIVF.  (had received gentle mIVF due to NPO status and prior to rayna %.     Temp:  [98 °F (36.7 °C)-99.3 °F (37.4 °C)] 98.6 °F (37 °C)  Pulse:  [] 104  Resp:  [16-20] 20  BP: (129-152)/(59-69) 152/63        Meds:      • Vancomycin HCl  125 mg Oral Daily   • melatonin  5 mg Oral Nightly   • metoprolol Tartrate  25 mg O Douglas Rosario says that [address] [place] [address] apparently told her brother (but not Douglas Rosario directly) that she \"was done\" a couple of times over the weekend and was tired of all of the poking and prodding.     I discussed that I can provide information about transition to co with right perihilar mass, liver metastasis.  Liver bx confirmed SCC  -on admit: hyperbilirubinemia, elevated liver enzymes, hyperammonemia  -recent abd u/s with multiple liver metastasis  -follow CMP  -undergoing inpatient chemo per oncology, s/p day 2 of 1.3 on admission from baseline 0.7  -poss hypovolemia from poor fluid intake and concurrent diuretic  -received IVF     Hyperammonemia  -with assoc mild encephalopathy  -due to liver mets above  - improved with lactulose, now with diarrhea  - will d/c lact chemotherapy/infection  - transfuse to keep hgb > 7  - transfuse to keep plts >10 or if bleeding.   - platelets improving      Frustrated/tired/anxiety  - seen by palliative care consult, appreciate recommendations      Insomnia  - better sleep with melato

## 2018-11-13 NOTE — PROGRESS NOTES
[facility] Hospitalist Progress Note     CC: Hospital Follow up    PCP: Dona Schaumann, MD       Assessment/Plan:     Principal Problem:    Jaundice  Active Problems:    Small cell lung cancer ([facility] [address]    Acute kidney injury ([facility] [address]    Hypokalemia    Azotemia    Deidra tolerated  -Now that diet advanced to clears, stop mIVF.  (had received gentle mIVF due to NPO status and prior to that had been on mIVF due to risk of TLS while on chemo)  -received one dose of IV lasix 20 mg on 11/5/18  - troponin mildly elevated 0.04, re weight 239 lb (108.4 kg), SpO2 90 %.     Temp:  [97.8 °F (36.6 °C)-98.2 °F (36.8 °C)] 97.9 °F (36.6 °C)  Pulse:  [] 98  Resp:  [18-20] 20  BP: (138-167)/(68-80) 138/68      Intake/Output:    Intake/Output Summary (Last 24 hours) at 11/13/2018 1340  La 0625   ALT  108*  92*  64*   AST  76*  64*  42*   ALB  1.6*  1.5*  1.5*         Imaging:  Ir Port A Cath Procedure    Result Date: 11/12/2018  CONCLUSION: Left IJ port check demonstrating no evidence of port malfunction.  Port was left accessed for immediat

## 2018-11-13 NOTE — PLAN OF CARE
ANXIETY    • Will report anxiety at manageable levels Progressing        DEATH & DYING    • Pt/Family communicate acceptance of impending death and feel psychological comfort and peace Progressing        PAIN - ADULT    • Verbalizes/displays adequate comfo

## 2018-11-13 NOTE — HOSPICE RN NOTE
Referral for hospice received. [facility] team consisting of nurse liaisons and  met in the patient's room with her wife Melani Adams, daughter Melony Gross, brother and sister in law.   Both the patient and her family have verbalized that the pa

## 2018-11-13 NOTE — DISCHARGE SUMMARY
[facility] Internal Medicine Discharge Summary   Patient ID:  Earnest Jacobs  [id number]  79year old  7/11/1950    Admit date: 11/1/2018    Discharge date and time: 11/13/2018  2:49 PM     Attending Physician: No att. providers found     Primary Care Physi hospice  -hospice order set, for now no morphine gtt but will consider in future    Benny, liver failure due to small cell lung ca with metastatic disease  -has hx of right hilar lymphadenopathy with right perihilar mass, liver metastasis.  Liver bx conf Date: 10/29/2018  DATE OF SERVICE: 10.29.2018 XR CHEST AP/PA (1 VIEW) (CPT=71045) CLINICAL INDICATION: Encounter for adjustment and management of vascular access device. COMPARISON STUDY: None available. TECHNIQUE: Single intraoperative AP view.  FINDINGS: followed while maintaining the necessary diagnostic image quality. Findings: Mild bifrontal parenchymal volume loss. There is compensatory degree of ventricular enlargement. No acute intracranial hemorrhage or extraaxial fluid collection.   Grey-white krystal extrahepatic biliary ductal dilatation. SPLEEN: No enlargement or focal lesion. STOMACH: No gross gastric mass, obstruction or focal abnormality. Duodenum unremarkable. PANCREAS: No lesion, fluid collection, ductal dilatation, or atrophy.   ADRENALS: No d imaging but measures approximately 32 x 58 mm. This extends into the right hilar region were has a bilobed configuration covering a region measuring 61 x 33 mm, which encases and narrows bronchovascular structures.   Probable post obstructive atelectasis a colonic wall thickening in the mid-sigmoid colon suggesting mild or resolving diverticulitis.   Dictated by (CST): Carlin Castillo MD on 11/07/2018 at 9:42     Approved by (CST): Carlin Castillo MD on 11/07/2018 at 9:57          Ct Abdomen+pelvis(contrast Only)(c aortic caval node measures 14 x 13 mm. Index portacaval node measures 17 x 22 mm. There is gas in the retroperitoneum discussed in the BOWEL/MESENTERY section below.  BOWEL/MESENTERY:  There is a small focus of gas located in the lower retroperitoneum ext right lung. Visualized right pulmonary hilum and mediastinum shows nodular soft tissue foci compatible with neoplasm. There are small bilateral pleural effusions which may be reactive however they could also represent metastatic sites.   There is also lym image through the liver was also obtained along with delayed images through the liver. Oral contrast was used for the examination. 80 mL of Isovue 370 were administered intravenously.  Automated exposure control and ALARA manual techniques for patient spec nodes bilaterally measuring up to 0.8 x 1.4 cm on the left side. LIVER: Multiple heterogeneous hypodense lesions are seen throughout the entire liver consistent with diffuse hepatic metastases GALLBLADDER/BILIARY TREE: Gallbladder is contracted.  PANCREAS: COMPARISON: [facility], XR CHEST AP PORTABLE (CPT=71045), 11/03/2018, 12:36. INDICATIONS: Increased shortness of breath. History of left lung cancer and emphysema. TECHNIQUE:   Single view.    FINDINGS: CARDIAC/VASC: The heart is borderlin elevation of the right hemidiaphragm. There  is blunting of the costophrenic angles. Azygos fissure is seen. BONES: No fracture or visible bony lesion.  OTHER: A central line has been inserted from the left jugular vein into the superior vena cava without c right lower lobe consolidation/atelectasis suggesting occlusion of bronchus intermedius. 2. Borderline cardiomegaly. 3. Atherosclerosis. 4. Prominent hilum and mediastinum. 5. Blunted right costophrenic angle. 6. Elevated right hemidiaphragm.  7. Azygos fis informed of the nature of the procedure which included a discussion of the benefits and risks including, but not limited to, infection, bleeding, injury to liver.   After obtaining informed consent, an ultrasound-guided biopsy  was performed in the usual st

## 2018-11-13 NOTE — PROGRESS NOTES
[facility] AND CLINICS  Progress Note    Marcello Azeri Patient Status:  Inpatient    1950 MRN [id number]   Location [facility] 4W/SW/SE Attending Blessing Rodriguez MD   Hosp Day # 15 PCP Sylvie Curiel MD     Subjective:   Wife at bedside quadrant) demonstrates a thin peripheral wall and also contains a small volume of   bowel contrast material, and this finding is close to the mid-sigmoid colon.   Findings suggests there has been recent perforation of the mid-sigmoid colon with early absces

## 2018-11-13 NOTE — PLAN OF CARE
RESPIRATORY - ADULT    • Achieves optimal ventilation and oxygenation Not Progressing          CARDIOVASCULAR - ADULT    • Absence of cardiac arrhythmias or at baseline Progressing        GASTROINTESTINAL - ADULT    • Minimal or absence of nausea and vomit

## 2018-11-13 NOTE — PROGRESS NOTES
Assessment/Plan:     1. Small cell lung cancer  - liver metastasis  2. Atrial fibrillation vs ST with PAT  3. Perforated bowel  Med  Rx   4.  Edema      PLAN:    -  Echo EF 60%  -  metoprolol and diltiazem  -  intermittent lasix for edema; will hold off 0.50        Years: 15.00        Pack years: 7.5        Quit date: 3/17/1990        Years since quittin.6      Smokeless tobacco: Never Used    Alcohol use:  Yes      Alcohol/week: 0.5 oz      Types: 1 Glasses of wine per week      Comment: socially Intravenous Once       Allergies:    Amoxicillin             HIVES  Penicillins             RASH      EXAM:     Patient Vitals for the past 24 hrs:   BP Temp Temp src Pulse Resp SpO2 Height Weight   11/02/18 0642 129/57 98 °F (36.7 °C) Oral 80 18 94 % 5' 3 MCH  34.3*   --    MCHC  34.8   --    RDW  16.1*   --    WBC  0.5*   --    PLT  37*   --          Imaging:  Ir Port A Cath Procedure    Result Date: 11/12/2018  CONCLUSION: Left IJ port check demonstrating no evidence of port malfunction.  Port was left

## 2018-11-14 NOTE — HOSPICE RN NOTE
GIP DAY 2   Patient less responsive today  Morphine drip was started at 1mg/hour for her dyspnea  NPO  Oxygen 2 liters nasal cannula  Having some anxiety and she was given IVP Ativan  POC was discussed with family at bedside  POC was also discussed with Co

## 2018-11-14 NOTE — PAYOR COMM NOTE
--------------  DISCHARGE REVIEW    [facility]  Subscriber #:  [id number]  Authorization Number: [id number]    Admit date: 11/1/18  Admit time:  2044  Discharge Date: 11/13/2018  2:49 PM     Admitting Physician: Yair Arenas DO  Attending Physician: presents sent by oncology for urgent chemotherapy. CT A/P revealed likely contained perforation of sigmoid colon with abscess. Treating conservatively, advancing diet. Pt with decreased po intake over the weekend and increasing fatigue.    Patient and fam    Leukocytosis->now pt pancytopenic with improving counts, see above  -likely 2/2 to intra-abdominal infection discussed above     SHERRILL - resolved with fluids, no further labs      Hyperammonemia: was given lactulose but stopped with diarrhea      [place] right middle lobe consolidation/atelectasis as seen on CT of same date.     Ct Brain (w+wo) (hmb=01239)    Result Date: 10/26/2018  DATE OF SERVICE: 10.26.2018 Examination: CT BRAIN (W+WO) (PEI=48260) Date: 10/26/2018 4:00 PM Comparison: None available Elsa iterative reconstruction technique for dose reduction was used. FINDINGS:  LIVER: Liver is enlarged at 20.5 cm in length.   The parenchyma is replaced by multiple hypoenhancing foci which have an variable degree of internal and peripheral enhancement sugg in the subcutaneous fat along the medial and lateral sides of the abdominal wall. ABDOMINAL WALL: Normal.  No mass or hernia. URINARY BLADDER: No visible focal wall thickening, lesion or calculus. PELVIC NODES: No enlarged mass or adenopathy.    PELVIC ORG contains multiple lesions, compatible with given history of metastasis. 2.  There is a gas and fluid collection in the lower retroperitoneum which extends into the left lower quadrant.   The most caudal aspect of this finding (in the left lower quadrant) de 40 mm in diameter. Index focus in the left lobe  adjacent to the caudate measures 25 mm. Portal and hepatic veins are patent. BILIARY: Post cholecystectomy. No intra or extrahepatic biliary ductal dilatation. SPLEEN: No enlargement or focal lesion.   STO lesion. LUNG BASES: There is a soft tissue nodule in the posterior aspect of the lower mediastinum measuring 25 x 48 mm (series 2, image 1) compatible with a lymph node.   Additional lymphadenopathy is present in the visualized right infrahilar region (Jeffrey Dictated by (CST): Charlie Ayers MD on 11/03/2018 at 14:02     Approved by (CST): Charlie Ayers MD on 11/03/2018 at 14:30          Ct Chest(cntrst Only)abdomen(w+wo)pelvis(cntrst Only)(ljb=62090/[id number])    Result Date: 10/16/2018  DATE OF SERVICE: 10.15.2018 lung upper lobe and right lung lower lobe and there are a few additional subpleural nodular densities or focal areas of atelectasis seen involving the right lung lower lobe including 2.6 x 1.3 cm subpleural opacity on image 142.  There are additional ground lung along with small nodular densities and/or atelectasis and groundglass opacities in the right lung upper and lower lobe and small nodule in the left lung. Possibility of metastatic disease is not excluded 4. Diffuse hepatic metastases 5.  Mildly promine [facility], XR CHEST AP PORTABLE (CPT=71045), 11/01/2018, 22:06. INDICATIONS: Hypoxia and follow up cough  TECHNIQUE:   Single view. FINDINGS: CARDIAC/VASC: The heart is borderline enlarged. Unremarkable pulmonary vasculature.   MEDIAST/KEENAN: The aorta is well as splaying of the subcarinal trachea consistent with adenopathy. LUNGS/PLEURA: There is right middle lobe consolidation/atelectasis reidentified appearing worse. There is also some consolidation/atelectasis of the right lower lobe.  There is blunting 15:56     Approved by (CST): Paulo Harvey MD on 11/12/2018 at 15:59          Ir Biopsy    Result Date: 10/22/2018  PROCEDURE: IR ULTRASOUND-GUIDED PERCUTANEOUS CORE LIVER LESION BIOPSY  INDICATIONS: 72-year-old woman with right lung mass and multiple li progress note    SW received order for hospice. SW [facility]    [facility]

## 2018-11-14 NOTE — SIGNIFICANT EVENT
Pt  at [address]. Resusitation not attempted as pt was DNR.     Time of Death 1    Family Notified yes  Name and Relation wife vic    MD Sandra Montana and [facility] [address] of [address] [facility] Notified yes (get Rep Name and Case Number) Grant Postal [id number]     contacte

## 2018-11-14 NOTE — CM/SW NOTE
GARCIA wood 11/14/18. The pt appears very close to passing. Family at bedside, managing and coping albeit sad. MSW supported family in eol care and activity in the room.   GARCIA Farooq  [facility]  [phone]

## 2018-11-14 NOTE — SPIRITUAL CARE NOTE
Request made for [address]. Spiritual Coordinator Lon Barrientos looking for  to visit.  GG

## 2018-11-14 NOTE — PROGRESS NOTES
[facility] Hospitalist Progress Note     CC: Hospital Follow up    PCP: Elsy Valles MD       Assessment/Plan:     Active Problems:    Non-small cell lung cancer with metastasis [facility]    Patient is a 79year old female with PMH sig for recently diagnosed sma covered by levaquin  -O2 at 4-5 L and did also get IV lasix  -now cont O2 for comfort  -appreciate cardiac eval      Pancytopenia  -persistent, 2/2 chemo, did get both rbc and plts during admission  -on 11/13, counts stable, ANC up to 1.1 from 0.1.  -no fu respiratory effort  CV: Heart with regular rate and rhythm   Abd: Abdomen soft, nontender, nondistended   MSK: Full range of motion in extremities, no clubbing, no cyanosis  Skin: no rashes or lesions  Neuro: alert  Psyc: calm      Data Review:       Labs:

## 2018-11-15 NOTE — DISCHARGE SUMMARY
General Medicine Discharge Summary     Patient ID:  Cole Michel  79year old  7/11/1950    Admit date: 11/13/2018    Death date and time: 11/14/18 time of death 1    Attending Physician: Marry Her MD     Consults: IP CONSULT TO SPIRITUAL CARE  on 18 at 1725.       Problem List:  Bebeandice, liver failure due to small cell lung ca with metastatic disease  Bowel perforation  Shortness of breath/Hypoxia  Pancytopenia  Atrial fibrillation   Leukocytosis    SHERRILL   Hyperammonemia: was given

## 2021-01-05 NOTE — HOME CARE LIAISON
Received referral from [facility] Britta Castle. Met with patient and dtr at the bedside. Patient is agreeable to [facility], pending orders and final discharge plan.  Patient and family stated their #1 choice is for rehab as the patient's dtr and w Detail Level: Detailed Size Of Lesion In Cm (Optional): 0.5 X Size Of Lesion In Cm (Optional): 0

## 2022-12-22 NOTE — PROGRESS NOTES
[facility]    Progress Note    Yenifer Schuler Patient Status:  Inpatient    1950 MRN [id number]   Location [facility] 4W/SW/SE Attending Andrez Reyes MD   Hosp Day # 2 PCP Samreen Larry MD       Subjective:   Itz Arnold Views), Right (cpt=[id number])    Result Date: 6/26/2017  CONCLUSION: Status post right knee arthroplasty. Assessment and Plan:    Active Problems:    Hypothyroidism    Primary osteoarthritis of right knee    Morbid obesity due to excess calories ( Yes
